# Patient Record
Sex: FEMALE | Race: WHITE | Employment: OTHER | URBAN - METROPOLITAN AREA
[De-identification: names, ages, dates, MRNs, and addresses within clinical notes are randomized per-mention and may not be internally consistent; named-entity substitution may affect disease eponyms.]

---

## 2017-01-29 DIAGNOSIS — E03.9 HYPOTHYROIDISM: ICD-10-CM

## 2017-01-30 RX ORDER — LEVOTHYROXINE SODIUM 0.1 MG/1
TABLET ORAL
Qty: 90 TABLET | Refills: 0 | Status: SHIPPED | OUTPATIENT
Start: 2017-01-30 | End: 2017-04-29 | Stop reason: SDUPTHER

## 2017-02-27 ENCOUNTER — OFFICE VISIT (OUTPATIENT)
Dept: FAMILY MEDICINE CLINIC | Age: 82
End: 2017-02-27

## 2017-02-27 VITALS
BODY MASS INDEX: 32.89 KG/M2 | HEART RATE: 106 BPM | DIASTOLIC BLOOD PRESSURE: 66 MMHG | RESPIRATION RATE: 12 BRPM | OXYGEN SATURATION: 97 % | SYSTOLIC BLOOD PRESSURE: 108 MMHG | WEIGHT: 217 LBS | HEIGHT: 68 IN

## 2017-02-27 DIAGNOSIS — J06.9 PROTRACTED URI: Primary | ICD-10-CM

## 2017-02-27 PROCEDURE — G8484 FLU IMMUNIZE NO ADMIN: HCPCS | Performed by: NURSE PRACTITIONER

## 2017-02-27 PROCEDURE — G8427 DOCREV CUR MEDS BY ELIG CLIN: HCPCS | Performed by: NURSE PRACTITIONER

## 2017-02-27 PROCEDURE — 4040F PNEUMOC VAC/ADMIN/RCVD: CPT | Performed by: NURSE PRACTITIONER

## 2017-02-27 PROCEDURE — G8417 CALC BMI ABV UP PARAM F/U: HCPCS | Performed by: NURSE PRACTITIONER

## 2017-02-27 PROCEDURE — 1090F PRES/ABSN URINE INCON ASSESS: CPT | Performed by: NURSE PRACTITIONER

## 2017-02-27 PROCEDURE — 1123F ACP DISCUSS/DSCN MKR DOCD: CPT | Performed by: NURSE PRACTITIONER

## 2017-02-27 PROCEDURE — 1036F TOBACCO NON-USER: CPT | Performed by: NURSE PRACTITIONER

## 2017-02-27 PROCEDURE — 99213 OFFICE O/P EST LOW 20 MIN: CPT | Performed by: NURSE PRACTITIONER

## 2017-02-27 PROCEDURE — G8400 PT W/DXA NO RESULTS DOC: HCPCS | Performed by: NURSE PRACTITIONER

## 2017-02-27 RX ORDER — AMOXICILLIN 875 MG/1
875 TABLET, COATED ORAL 2 TIMES DAILY
Qty: 14 TABLET | Refills: 0 | Status: SHIPPED | OUTPATIENT
Start: 2017-02-27 | End: 2017-03-06

## 2017-02-27 ASSESSMENT — ENCOUNTER SYMPTOMS
SHORTNESS OF BREATH: 0
COUGH: 1
RHINORRHEA: 1
WHEEZING: 0
SORE THROAT: 0
CHEST TIGHTNESS: 0
SINUS PRESSURE: 1

## 2017-02-27 ASSESSMENT — PATIENT HEALTH QUESTIONNAIRE - PHQ9
2. FEELING DOWN, DEPRESSED OR HOPELESS: 0
1. LITTLE INTEREST OR PLEASURE IN DOING THINGS: 0
SUM OF ALL RESPONSES TO PHQ9 QUESTIONS 1 & 2: 0
SUM OF ALL RESPONSES TO PHQ QUESTIONS 1-9: 0

## 2017-03-15 ENCOUNTER — OFFICE VISIT (OUTPATIENT)
Dept: ENT CLINIC | Age: 82
End: 2017-03-15

## 2017-03-15 VITALS
HEIGHT: 68 IN | BODY MASS INDEX: 33.19 KG/M2 | WEIGHT: 219 LBS | DIASTOLIC BLOOD PRESSURE: 70 MMHG | SYSTOLIC BLOOD PRESSURE: 119 MMHG | HEART RATE: 93 BPM

## 2017-03-15 DIAGNOSIS — H90.0 CONDUCTIVE HEARING LOSS OF BOTH EARS: ICD-10-CM

## 2017-03-15 DIAGNOSIS — H61.23 IMPACTED CERUMEN OF BOTH EARS: Primary | ICD-10-CM

## 2017-03-15 PROCEDURE — 69210 REMOVE IMPACTED EAR WAX UNI: CPT | Performed by: OTOLARYNGOLOGY

## 2017-03-15 PROCEDURE — 1036F TOBACCO NON-USER: CPT | Performed by: OTOLARYNGOLOGY

## 2017-04-29 DIAGNOSIS — E03.9 HYPOTHYROIDISM: ICD-10-CM

## 2017-05-01 RX ORDER — LEVOTHYROXINE SODIUM 0.1 MG/1
TABLET ORAL
Qty: 90 TABLET | Refills: 0 | Status: SHIPPED | OUTPATIENT
Start: 2017-05-01 | End: 2017-07-27 | Stop reason: SDUPTHER

## 2017-07-27 DIAGNOSIS — E03.9 HYPOTHYROIDISM: ICD-10-CM

## 2017-07-27 DIAGNOSIS — E03.9 ACQUIRED HYPOTHYROIDISM: ICD-10-CM

## 2017-07-27 LAB — T4 TOTAL: 8.1 UG/DL (ref 4.5–10.9)

## 2017-07-27 RX ORDER — LEVOTHYROXINE SODIUM 0.1 MG/1
TABLET ORAL
Qty: 90 TABLET | Refills: 0 | Status: SHIPPED | OUTPATIENT
Start: 2017-07-27 | End: 2017-10-30 | Stop reason: SDUPTHER

## 2017-07-28 LAB — TSH SERPL DL<=0.05 MIU/L-ACNC: 3.71 UIU/ML (ref 0.27–4.2)

## 2017-09-13 ENCOUNTER — OFFICE VISIT (OUTPATIENT)
Dept: ENT CLINIC | Age: 82
End: 2017-09-13

## 2017-09-13 VITALS
SYSTOLIC BLOOD PRESSURE: 131 MMHG | DIASTOLIC BLOOD PRESSURE: 76 MMHG | HEART RATE: 92 BPM | BODY MASS INDEX: 33.05 KG/M2 | HEIGHT: 68 IN | WEIGHT: 218.1 LBS

## 2017-09-13 DIAGNOSIS — H61.23 IMPACTED CERUMEN OF BOTH EARS: Primary | ICD-10-CM

## 2017-09-13 DIAGNOSIS — H90.0 CONDUCTIVE HEARING LOSS OF BOTH EARS: ICD-10-CM

## 2017-09-13 PROCEDURE — 1036F TOBACCO NON-USER: CPT | Performed by: OTOLARYNGOLOGY

## 2017-10-30 DIAGNOSIS — E03.9 HYPOTHYROIDISM: ICD-10-CM

## 2017-10-31 RX ORDER — LEVOTHYROXINE SODIUM 0.1 MG/1
TABLET ORAL
Qty: 90 TABLET | Refills: 1 | Status: SHIPPED | OUTPATIENT
Start: 2017-10-31 | End: 2018-04-21 | Stop reason: SDUPTHER

## 2017-11-06 ENCOUNTER — TELEPHONE (OUTPATIENT)
Dept: FAMILY MEDICINE CLINIC | Age: 82
End: 2017-11-06

## 2017-11-06 NOTE — TELEPHONE ENCOUNTER
Per EPIC report patient is due for office visit. Left message for patient to call for appointment    I left message at both home and mobil numbers for pt to call for appt.     Please reach out to patient again later this week if she has not made an appt with either Lisa Bolus or [employer]

## 2017-11-07 NOTE — TELEPHONE ENCOUNTER
Called patient and informed her that she is due for an appt. Patient seemed upset that she had to schedule an appt even after seeing Haylee Justice earlier this year, but scheduled for 12/01/2017. This message has been completed. Please close encounter.

## 2017-12-01 ENCOUNTER — OFFICE VISIT (OUTPATIENT)
Dept: FAMILY MEDICINE CLINIC | Age: 82
End: 2017-12-01

## 2017-12-01 VITALS
TEMPERATURE: 97.7 F | HEART RATE: 93 BPM | WEIGHT: 217 LBS | SYSTOLIC BLOOD PRESSURE: 130 MMHG | DIASTOLIC BLOOD PRESSURE: 70 MMHG | BODY MASS INDEX: 32.89 KG/M2 | HEIGHT: 68 IN | RESPIRATION RATE: 14 BRPM | OXYGEN SATURATION: 98 %

## 2017-12-01 DIAGNOSIS — E03.9 ACQUIRED HYPOTHYROIDISM: ICD-10-CM

## 2017-12-01 DIAGNOSIS — M54.50 CHRONIC LEFT-SIDED LOW BACK PAIN WITHOUT SCIATICA: ICD-10-CM

## 2017-12-01 DIAGNOSIS — N18.4 CHRONIC KIDNEY DISEASE (CKD), STAGE IV (SEVERE) (HCC): ICD-10-CM

## 2017-12-01 DIAGNOSIS — I10 ESSENTIAL HYPERTENSION: ICD-10-CM

## 2017-12-01 DIAGNOSIS — G89.29 CHRONIC LEFT-SIDED LOW BACK PAIN WITHOUT SCIATICA: ICD-10-CM

## 2017-12-01 DIAGNOSIS — J44.9 CHRONIC OBSTRUCTIVE PULMONARY DISEASE, UNSPECIFIED COPD TYPE (HCC): Primary | ICD-10-CM

## 2017-12-01 PROCEDURE — 4040F PNEUMOC VAC/ADMIN/RCVD: CPT | Performed by: FAMILY MEDICINE

## 2017-12-01 PROCEDURE — G8417 CALC BMI ABV UP PARAM F/U: HCPCS | Performed by: FAMILY MEDICINE

## 2017-12-01 PROCEDURE — 1123F ACP DISCUSS/DSCN MKR DOCD: CPT | Performed by: FAMILY MEDICINE

## 2017-12-01 PROCEDURE — 3023F SPIROM DOC REV: CPT | Performed by: FAMILY MEDICINE

## 2017-12-01 PROCEDURE — G8926 SPIRO NO PERF OR DOC: HCPCS | Performed by: FAMILY MEDICINE

## 2017-12-01 PROCEDURE — G8484 FLU IMMUNIZE NO ADMIN: HCPCS | Performed by: FAMILY MEDICINE

## 2017-12-01 PROCEDURE — 1036F TOBACCO NON-USER: CPT | Performed by: FAMILY MEDICINE

## 2017-12-01 PROCEDURE — 99214 OFFICE O/P EST MOD 30 MIN: CPT | Performed by: FAMILY MEDICINE

## 2017-12-01 PROCEDURE — 1090F PRES/ABSN URINE INCON ASSESS: CPT | Performed by: FAMILY MEDICINE

## 2017-12-01 PROCEDURE — G8427 DOCREV CUR MEDS BY ELIG CLIN: HCPCS | Performed by: FAMILY MEDICINE

## 2017-12-01 PROCEDURE — G8400 PT W/DXA NO RESULTS DOC: HCPCS | Performed by: FAMILY MEDICINE

## 2017-12-01 ASSESSMENT — ENCOUNTER SYMPTOMS
COUGH: 0
SINUS PRESSURE: 0
BACK PAIN: 1
SHORTNESS OF BREATH: 0
WHEEZING: 0
GASTROINTESTINAL NEGATIVE: 1

## 2017-12-01 NOTE — PROGRESS NOTES
normal.       Assessment/Plan:    Ximena Carlton was seen today for other. Diagnoses and all orders for this visit:    Chronic obstructive pulmonary disease, unspecified COPD type ([facility] [address]  Visit Dx:  Chronic obstructive pulmonary disease, unspecified COPD type ([address]  Stable based upon symptoms and exam. Continue current treatment plan and follow up at least yearly. Last edited 12/01/17 11:02 EST by Delaney Beverly MD     Chronic kidney disease (CKD), stage IV (severe) ([address]  Quality & Risk Score Accuracy - MEDICARE ADVANTAGE    Visit Dx:  Chronic kidney disease (CKD), stage IV (severe) ([address]  Worsening based upon last GFR. Appropriate treatment plan in place, including avoidance of nephrotoxins- continue. Monitoring per progress note/disposition.   Additional documentation:  see HPI regarding dialysis            Essential hypertension  Reasonably well controlled  Continue current treatment   Acquired hypothyroidism  Stable/Controlled  Continue current treatment   Chronic left-sided low back pain without sciatica  Symptomatic treatment   Return if not better

## 2017-12-01 NOTE — PATIENT INSTRUCTIONS
Patient Education        Back Pain: Care Instructions  Your Care Instructions    Back pain has many possible causes. It is often related to problems with muscles and ligaments of the back. It may also be related to problems with the nerves, discs, or bones of the back. Moving, lifting, standing, sitting, or sleeping in an awkward way can strain the back. Sometimes you don't notice the injury until later. Arthritis is another common cause of back pain. Although it may hurt a lot, back pain usually improves on its own within several weeks. Most people recover in 12 weeks or less. Using good home treatment and being careful not to stress your back can help you feel better sooner. Follow-up care is a key part of your treatment and safety. Be sure to make and go to all appointments, and call your doctor if you are having problems. Its also a good idea to know your test results and keep a list of the medicines you take. How can you care for yourself at home? · Sit or lie in positions that are most comfortable and reduce your pain. Try one of these positions when you lie down:  ¨ Lie on your back with your knees bent and supported by large pillows. ¨ Lie on the floor with your legs on the seat of a sofa or chair. Sherian Seals on your side with your knees and hips bent and a pillow between your legs. ¨ Lie on your stomach if it does not make pain worse. · Do not sit up in bed, and avoid soft couches and twisted positions. Bed rest can help relieve pain at first, but it delays healing. Avoid bed rest after the first day of back pain. · Change positions every 30 minutes. If you must sit for long periods of time, take breaks from sitting. Get up and walk around, or lie in a comfortable position. · Try using a heating pad on a low or medium setting for 15 to 20 minutes every 2 or 3 hours. Try a warm shower in place of one session with the heating pad.   · You can also try an ice pack for 10 to 15 minutes every 2 to 3

## 2018-03-13 ENCOUNTER — OFFICE VISIT (OUTPATIENT)
Dept: ENT CLINIC | Age: 83
End: 2018-03-13

## 2018-03-13 VITALS — HEART RATE: 85 BPM | SYSTOLIC BLOOD PRESSURE: 118 MMHG | DIASTOLIC BLOOD PRESSURE: 84 MMHG

## 2018-03-13 DIAGNOSIS — H90.0 CONDUCTIVE HEARING LOSS OF BOTH EARS: ICD-10-CM

## 2018-03-13 DIAGNOSIS — H61.23 IMPACTED CERUMEN OF BOTH EARS: ICD-10-CM

## 2018-03-13 PROCEDURE — 69210 REMOVE IMPACTED EAR WAX UNI: CPT | Performed by: OTOLARYNGOLOGY

## 2018-03-13 RX ORDER — HYDROCHLOROTHIAZIDE 25 MG/1
25 TABLET ORAL DAILY
COMMUNITY
End: 2018-10-05

## 2018-03-13 NOTE — PATIENT INSTRUCTIONS
1. You may use an over the counter ear wax removal kit (such as Murine, Bausch and Lomb, NeilMed, or Debrox wax removal system) for ear wax removal, as needed. 2. It may help to use Debrox (OTC) for 4 days prior to future visits for ear cleaning. This may soften your ear wax and facilitate removal of the wax. NO Q-TIPS IN THE EARS Do not clean ears with Q-tips or any other instrument. I emphasized the danger of this practice, especially rupture of ear drum, dislocation or other damage to ossicle, and permanent, irreversible, and irreparable hearing loss.

## 2018-04-21 DIAGNOSIS — E03.9 HYPOTHYROIDISM: ICD-10-CM

## 2018-04-23 RX ORDER — LEVOTHYROXINE SODIUM 0.1 MG/1
TABLET ORAL
Qty: 90 TABLET | Refills: 1 | Status: SHIPPED | OUTPATIENT
Start: 2018-04-23 | End: 2018-10-31 | Stop reason: SDUPTHER

## 2018-07-12 ENCOUNTER — OFFICE VISIT (OUTPATIENT)
Dept: FAMILY MEDICINE CLINIC | Age: 83
End: 2018-07-12

## 2018-07-12 VITALS
TEMPERATURE: 98.7 F | HEART RATE: 93 BPM | DIASTOLIC BLOOD PRESSURE: 84 MMHG | SYSTOLIC BLOOD PRESSURE: 140 MMHG | OXYGEN SATURATION: 98 % | WEIGHT: 212 LBS | BODY MASS INDEX: 32.24 KG/M2

## 2018-07-12 DIAGNOSIS — J44.9 CHRONIC OBSTRUCTIVE PULMONARY DISEASE, UNSPECIFIED COPD TYPE (HCC): ICD-10-CM

## 2018-07-12 DIAGNOSIS — H10.33 ACUTE CONJUNCTIVITIS OF BOTH EYES, UNSPECIFIED ACUTE CONJUNCTIVITIS TYPE: ICD-10-CM

## 2018-07-12 DIAGNOSIS — R05.9 COUGH: Primary | ICD-10-CM

## 2018-07-12 PROCEDURE — G8400 PT W/DXA NO RESULTS DOC: HCPCS | Performed by: FAMILY MEDICINE

## 2018-07-12 PROCEDURE — 1036F TOBACCO NON-USER: CPT | Performed by: FAMILY MEDICINE

## 2018-07-12 PROCEDURE — 1123F ACP DISCUSS/DSCN MKR DOCD: CPT | Performed by: FAMILY MEDICINE

## 2018-07-12 PROCEDURE — 3023F SPIROM DOC REV: CPT | Performed by: FAMILY MEDICINE

## 2018-07-12 PROCEDURE — 99213 OFFICE O/P EST LOW 20 MIN: CPT | Performed by: FAMILY MEDICINE

## 2018-07-12 PROCEDURE — G8427 DOCREV CUR MEDS BY ELIG CLIN: HCPCS | Performed by: FAMILY MEDICINE

## 2018-07-12 PROCEDURE — 4040F PNEUMOC VAC/ADMIN/RCVD: CPT | Performed by: FAMILY MEDICINE

## 2018-07-12 PROCEDURE — 1101F PT FALLS ASSESS-DOCD LE1/YR: CPT | Performed by: FAMILY MEDICINE

## 2018-07-12 PROCEDURE — 1090F PRES/ABSN URINE INCON ASSESS: CPT | Performed by: FAMILY MEDICINE

## 2018-07-12 PROCEDURE — G8417 CALC BMI ABV UP PARAM F/U: HCPCS | Performed by: FAMILY MEDICINE

## 2018-07-12 PROCEDURE — G8926 SPIRO NO PERF OR DOC: HCPCS | Performed by: FAMILY MEDICINE

## 2018-07-12 RX ORDER — AZITHROMYCIN 250 MG/1
TABLET, FILM COATED ORAL
Qty: 6 TABLET | Refills: 0 | Status: SHIPPED | OUTPATIENT
Start: 2018-07-12 | End: 2018-07-22

## 2018-07-12 RX ORDER — SULFACETAMIDE SODIUM 100 MG/ML
1 SOLUTION/ DROPS OPHTHALMIC 4 TIMES DAILY
Qty: 1 BOTTLE | Refills: 0 | Status: SHIPPED | OUTPATIENT
Start: 2018-07-12 | End: 2018-07-22

## 2018-07-12 ASSESSMENT — PATIENT HEALTH QUESTIONNAIRE - PHQ9
1. LITTLE INTEREST OR PLEASURE IN DOING THINGS: 0
SUM OF ALL RESPONSES TO PHQ QUESTIONS 1-9: 0
SUM OF ALL RESPONSES TO PHQ9 QUESTIONS 1 & 2: 0
2. FEELING DOWN, DEPRESSED OR HOPELESS: 0

## 2018-07-12 ASSESSMENT — ENCOUNTER SYMPTOMS
COUGH: 1
GASTROINTESTINAL NEGATIVE: 1
EYE REDNESS: 1
SHORTNESS OF BREATH: 0
SORE THROAT: 1
WHEEZING: 0

## 2018-07-12 NOTE — PATIENT INSTRUCTIONS
Patient Education        Chronic Obstructive Pulmonary Disease (COPD): Care Instructions  Your Care Instructions    Chronic obstructive pulmonary disease (COPD) is a general term for a group of lung diseases, including emphysema and chronic bronchitis. People with COPD have decreased airflow in and out of the lungs, which makes it hard to breathe. The airways also can get clogged with thick mucus. Cigarette smoking is a major cause of COPD. Although there is no cure for COPD, you can slow its progress. Following your treatment plan and taking care of yourself can help you feel better and live longer. Follow-up care is a key part of your treatment and safety. Be sure to make and go to all appointments, and call your doctor if you are having problems. It's also a good idea to know your test results and keep a list of the medicines you take. How can you care for yourself at home?   Staying healthy    · Do not smoke. This is the most important step you can take to prevent more damage to your lungs. If you need help quitting, talk to your doctor about stop-smoking programs and medicines. These can increase your chances of quitting for good.     · Avoid colds and flu. Get a pneumococcal vaccine shot. If you have had one before, ask your doctor whether you need a second dose. Get the flu vaccine every fall. If you must be around people with colds or the flu, wash your hands often.     · Avoid secondhand smoke, air pollution, and high altitudes. Also avoid cold, dry air and hot, humid air. Stay at home with your windows closed when air pollution is bad.    Medicines and oxygen therapy    · Take your medicines exactly as prescribed. Call your doctor if you think you are having a problem with your medicine.     · You may be taking medicines such as:  ¨ Bronchodilators. These help open your airways and make breathing easier.  Bronchodilators are either short-acting (work for 6 to 9 hours) or long-acting (work for 25 hours). You inhale most bronchodilators, so they start to act quickly. Always carry your quick-relief inhaler with you in case you need it while you are away from home. ¨ Corticosteroids (prednisone, budesonide). These reduce airway inflammation. They come in pill or inhaled form. You must take these medicines every day for them to work well.     · A spacer may help you get more inhaled medicine to your lungs. Ask your doctor or pharmacist if a spacer is right for you. If it is, ask how to use it properly.     · Do not take any vitamins, over-the-counter medicine, or herbal products without talking to your doctor first.     · If your doctor prescribed antibiotics, take them as directed. Do not stop taking them just because you feel better. You need to take the full course of antibiotics.     · Oxygen therapy boosts the amount of oxygen in your blood and helps you breathe easier. Use the flow rate your doctor has recommended, and do not change it without talking to your doctor first.   Activity    · Get regular exercise. Walking is an easy way to get exercise. Start out slowly, and walk a little more each day.     · Pay attention to your breathing. You are exercising too hard if you cannot talk while you are exercising.     · Take short rest breaks when doing household chores and other activities.     · Learn breathing methods-such as breathing through pursed lips-to help you become less short of breath.     · If your doctor has not set you up with a pulmonary rehabilitation program, talk to him or her about whether rehab is right for you. Rehab includes exercise programs, education about your disease and how to manage it, help with diet and other changes, and emotional support. Diet    · Eat regular, healthy meals. Use bronchodilators about 1 hour before you eat to make it easier to eat. Eat several small meals instead of three large ones. Drink beverages at the end of the meal. Avoid foods that are hard to chew.

## 2018-07-12 NOTE — PROGRESS NOTES
unspecified COPD type ([facility]  Assessment and plan:  Stable based upon symptoms and exam. Continue current treatment plan and follow up at least yearly.   Last edited 07/12/18 18:00 EDT by Chidi Schmidt MD

## 2018-07-13 ENCOUNTER — TELEPHONE (OUTPATIENT)
Dept: FAMILY MEDICINE CLINIC | Age: 83
End: 2018-07-13

## 2018-07-13 NOTE — TELEPHONE ENCOUNTER
Pt's daughter calling and states that her eyes were more itchy and she feels they are worse than when Dr. Malena Bella saw her yesterday. Daughter wants to know what she can do.  Please advise

## 2018-07-13 NOTE — TELEPHONE ENCOUNTER
I suspect this is probably due to an allergy. There are 2 things we can try: If the itching is the redness around the eyes we could use a cortisone cream for her to apply to the skin but this would not go into the eyes nor should it get into the eyes. If the itching is more the eyeball itself we could try an allergy-type drop like we discussed last night. We could also do both if the itching is both the skin and the eyeball. Let me know what they want to do.

## 2018-09-14 ENCOUNTER — OFFICE VISIT (OUTPATIENT)
Dept: FAMILY MEDICINE CLINIC | Age: 83
End: 2018-09-14

## 2018-09-14 VITALS
BODY MASS INDEX: 32.21 KG/M2 | WEIGHT: 211.8 LBS | HEART RATE: 85 BPM | OXYGEN SATURATION: 96 % | DIASTOLIC BLOOD PRESSURE: 82 MMHG | SYSTOLIC BLOOD PRESSURE: 122 MMHG

## 2018-09-14 DIAGNOSIS — M25.512 ACUTE PAIN OF LEFT SHOULDER: Primary | ICD-10-CM

## 2018-09-14 PROCEDURE — 4040F PNEUMOC VAC/ADMIN/RCVD: CPT | Performed by: FAMILY MEDICINE

## 2018-09-14 PROCEDURE — 1036F TOBACCO NON-USER: CPT | Performed by: FAMILY MEDICINE

## 2018-09-14 PROCEDURE — G8427 DOCREV CUR MEDS BY ELIG CLIN: HCPCS | Performed by: FAMILY MEDICINE

## 2018-09-14 PROCEDURE — 1090F PRES/ABSN URINE INCON ASSESS: CPT | Performed by: FAMILY MEDICINE

## 2018-09-14 PROCEDURE — G8417 CALC BMI ABV UP PARAM F/U: HCPCS | Performed by: FAMILY MEDICINE

## 2018-09-14 PROCEDURE — 99213 OFFICE O/P EST LOW 20 MIN: CPT | Performed by: FAMILY MEDICINE

## 2018-09-14 PROCEDURE — 1101F PT FALLS ASSESS-DOCD LE1/YR: CPT | Performed by: FAMILY MEDICINE

## 2018-09-14 PROCEDURE — G8400 PT W/DXA NO RESULTS DOC: HCPCS | Performed by: FAMILY MEDICINE

## 2018-09-14 PROCEDURE — 1123F ACP DISCUSS/DSCN MKR DOCD: CPT | Performed by: FAMILY MEDICINE

## 2018-09-14 NOTE — PATIENT INSTRUCTIONS
Patient Education   Patient Education        Shoulder Pain: Care Instructions  Your Care Instructions    You can hurt your shoulder by using it too much during an activity, such as fishing or baseball. It can also happen as part of the everyday wear and tear of getting older. Shoulder injuries can be slow to heal, but your shoulder should get better with time. Your doctor may recommend a sling to rest your shoulder. If you have injured your shoulder, you may need testing and treatment. Follow-up care is a key part of your treatment and safety. Be sure to make and go to all appointments, and call your doctor if you are having problems. It's also a good idea to know your test results and keep a list of the medicines you take. How can you care for yourself at home? · Take pain medicines exactly as directed. ¨ If the doctor gave you a prescription medicine for pain, take it as prescribed. ¨ If you are not taking a prescription pain medicine, ask your doctor if you can take an over-the-counter medicine. ¨ Do not take two or more pain medicines at the same time unless the doctor told you to. Many pain medicines contain acetaminophen, which is Tylenol. Too much acetaminophen (Tylenol) can be harmful. · If your doctor recommends that you wear a sling, use it as directed. Do not take it off before your doctor tells you to. · Put ice or a cold pack on the sore area for 10 to 20 minutes at a time. Put a thin cloth between the ice and your skin. · If there is no swelling, you can put moist heat, a heating pad, or a warm cloth on your shoulder. Some doctors suggest alternating between hot and cold. · Rest your shoulder for a few days. If your doctor recommends it, you can then begin gentle exercise of the shoulder, but do not lift anything heavy. When should you call for help? Call 911 anytime you think you may need emergency care. For example, call if:    · You have chest pain or pressure.  This may occur

## 2018-09-14 NOTE — PROGRESS NOTES
patient's condition  Advise to return here if worse or go to nearest ER  Encourage fluids  Pt discharged in stable condition at 11:14      1. Acute pain of left shoulder        No orders of the defined types were placed in this encounter. Return if symptoms worsen or fail to improve.     Deanna Claudio MD    9/14/2018  11:14 AM

## 2018-10-05 ENCOUNTER — OFFICE VISIT (OUTPATIENT)
Dept: ENT CLINIC | Age: 83
End: 2018-10-05
Payer: MEDICARE

## 2018-10-05 VITALS
HEIGHT: 68 IN | SYSTOLIC BLOOD PRESSURE: 136 MMHG | HEART RATE: 106 BPM | DIASTOLIC BLOOD PRESSURE: 92 MMHG | BODY MASS INDEX: 32.13 KG/M2 | WEIGHT: 212 LBS

## 2018-10-05 DIAGNOSIS — H90.0 CONDUCTIVE HEARING LOSS OF BOTH EARS: ICD-10-CM

## 2018-10-05 DIAGNOSIS — H61.23 IMPACTED CERUMEN OF BOTH EARS: Primary | ICD-10-CM

## 2018-10-05 PROCEDURE — 69210 REMOVE IMPACTED EAR WAX UNI: CPT | Performed by: OTOLARYNGOLOGY

## 2019-02-13 DIAGNOSIS — E03.9 HYPOTHYROIDISM: ICD-10-CM

## 2019-02-13 LAB
ALBUMIN SERPL-MCNC: 4.2 G/DL (ref 3.4–5)
ANION GAP SERPL CALCULATED.3IONS-SCNC: 13 MMOL/L (ref 3–16)
BUN BLDV-MCNC: 46 MG/DL (ref 7–20)
CALCIUM SERPL-MCNC: 10.1 MG/DL (ref 8.3–10.6)
CHLORIDE BLD-SCNC: 103 MMOL/L (ref 99–110)
CO2: 24 MMOL/L (ref 21–32)
CREAT SERPL-MCNC: 2.1 MG/DL (ref 0.6–1.2)
CREATININE URINE: 158.7 MG/DL (ref 28–259)
GFR AFRICAN AMERICAN: 27
GFR NON-AFRICAN AMERICAN: 23
GLUCOSE BLD-MCNC: 86 MG/DL (ref 70–99)
HCT VFR BLD CALC: 43.9 % (ref 36–48)
HEMOGLOBIN: 13.9 G/DL (ref 12–16)
MCH RBC QN AUTO: 29.6 PG (ref 26–34)
MCHC RBC AUTO-ENTMCNC: 31.6 G/DL (ref 31–36)
MCV RBC AUTO: 93.8 FL (ref 80–100)
PDW BLD-RTO: 15.3 % (ref 12.4–15.4)
PHOSPHORUS: 3.8 MG/DL (ref 2.5–4.9)
PLATELET # BLD: 194 K/UL (ref 135–450)
PMV BLD AUTO: 10.6 FL (ref 5–10.5)
POTASSIUM SERPL-SCNC: 4.7 MMOL/L (ref 3.5–5.1)
RBC # BLD: 4.68 M/UL (ref 4–5.2)
SODIUM BLD-SCNC: 140 MMOL/L (ref 136–145)
T4 TOTAL: 8.1 UG/DL (ref 4.5–10.9)
TSH SERPL DL<=0.05 MIU/L-ACNC: 2.45 UIU/ML (ref 0.27–4.2)
WBC # BLD: 6.8 K/UL (ref 4–11)

## 2019-02-19 PROBLEM — R80.0 ISOLATED PROTEINURIA: Status: ACTIVE | Noted: 2019-02-19

## 2019-04-05 ENCOUNTER — OFFICE VISIT (OUTPATIENT)
Dept: ENT CLINIC | Age: 84
End: 2019-04-05
Payer: MEDICARE

## 2019-04-05 VITALS
DIASTOLIC BLOOD PRESSURE: 73 MMHG | SYSTOLIC BLOOD PRESSURE: 130 MMHG | HEIGHT: 68 IN | HEART RATE: 103 BPM | WEIGHT: 204 LBS | BODY MASS INDEX: 30.92 KG/M2

## 2019-04-05 DIAGNOSIS — H61.23 IMPACTED CERUMEN OF BOTH EARS: Primary | ICD-10-CM

## 2019-04-05 DIAGNOSIS — H90.0 CONDUCTIVE HEARING LOSS OF BOTH EARS: ICD-10-CM

## 2019-04-05 PROCEDURE — 69210 REMOVE IMPACTED EAR WAX UNI: CPT | Performed by: OTOLARYNGOLOGY

## 2019-04-05 NOTE — PROGRESS NOTES
Chief Complaint   Patient presents with    Cerumen Impaction    Hearing Loss       HISTORY:    Price Sexton stated that the hearing is decreased in both ears, which are plugged up with ear wax. Right ear, 3 weeks ago, stopped wearing my ear rings and hearing aid because ear lobe was hurting, not swollen or red, so just operated with the my left ehearing aid and I did well with it and then it just disappeared. It is gone now. She used Debrox on the right ear this morning. Wears hearing aids in both ears. She stopped wearing the right HA when she had the discomfort and seemed to hear just as well with only the left aid in place. EXAMINATION:    Both external ear canals were occluded, right 100% and left 80%, by cerumen impaction, obscuring visualization of the TMs. PROCEDURE - REMOVAL OF BILATERAL CERUMEN IMPACTION:   The cerumen impaction was removed from both of the EACs, under otomicroscopy visualization, with instrumentation, using a suction on the right ear and a wire loop on the left ear. After successful cerumen removal, the EACs appeared to be normal and clear bilaterally without mass, exudate, or edema. The tympanic membranes were mildly dull and thickened, non-erythematous, with no perforations or middle ear effusion. There was no evidence of acute disease. IMPRESSION / [place] / Talon Meigs / PROCEDURES:       Price Sexton was seen today for cerumen impaction and hearing loss. Diagnoses and all orders for this visit:    Impacted cerumen of both ears    Conductive hearing loss of both ears         RECOMMENDATIONS / PLAN:   1. See Patient Instructions. 2. Return in about 6 months (around 10/5/2019) for recheck/follow-up, possible ear cleaning, and sooner if condition worsens.

## 2019-04-23 ENCOUNTER — TELEPHONE (OUTPATIENT)
Dept: FAMILY MEDICINE CLINIC | Age: 84
End: 2019-04-23

## 2019-05-30 ENCOUNTER — OFFICE VISIT (OUTPATIENT)
Dept: FAMILY MEDICINE CLINIC | Age: 84
End: 2019-05-30
Payer: MEDICARE

## 2019-05-30 VITALS
OXYGEN SATURATION: 97 % | BODY MASS INDEX: 30.87 KG/M2 | HEART RATE: 96 BPM | WEIGHT: 203 LBS | SYSTOLIC BLOOD PRESSURE: 114 MMHG | DIASTOLIC BLOOD PRESSURE: 82 MMHG

## 2019-05-30 DIAGNOSIS — E03.9 ACQUIRED HYPOTHYROIDISM: ICD-10-CM

## 2019-05-30 DIAGNOSIS — Z00.00 ROUTINE GENERAL MEDICAL EXAMINATION AT A HEALTH CARE FACILITY: ICD-10-CM

## 2019-05-30 DIAGNOSIS — Z00.00 MEDICARE ANNUAL WELLNESS VISIT, INITIAL: Primary | ICD-10-CM

## 2019-05-30 DIAGNOSIS — I10 ESSENTIAL HYPERTENSION: ICD-10-CM

## 2019-05-30 DIAGNOSIS — J44.9 CHRONIC OBSTRUCTIVE PULMONARY DISEASE, UNSPECIFIED COPD TYPE (HCC): ICD-10-CM

## 2019-05-30 DIAGNOSIS — N18.4 STAGE 4 CHRONIC KIDNEY DISEASE (HCC): ICD-10-CM

## 2019-05-30 PROCEDURE — 1123F ACP DISCUSS/DSCN MKR DOCD: CPT | Performed by: FAMILY MEDICINE

## 2019-05-30 PROCEDURE — G0438 PPPS, INITIAL VISIT: HCPCS | Performed by: FAMILY MEDICINE

## 2019-05-30 PROCEDURE — 4040F PNEUMOC VAC/ADMIN/RCVD: CPT | Performed by: FAMILY MEDICINE

## 2019-05-30 ASSESSMENT — LIFESTYLE VARIABLES
AUDIT TOTAL SCORE: 1
HOW OFTEN DO YOU HAVE SIX OR MORE DRINKS ON ONE OCCASION: 0
HOW OFTEN DURING THE LAST YEAR HAVE YOU FAILED TO DO WHAT WAS NORMALLY EXPECTED FROM YOU BECAUSE OF DRINKING: 0
HOW OFTEN DURING THE LAST YEAR HAVE YOU FOUND THAT YOU WERE NOT ABLE TO STOP DRINKING ONCE YOU HAD STARTED: 0
HOW OFTEN DURING THE LAST YEAR HAVE YOU NEEDED AN ALCOHOLIC DRINK FIRST THING IN THE MORNING TO GET YOURSELF GOING AFTER A NIGHT OF HEAVY DRINKING: 0
AUDIT-C TOTAL SCORE: 1
HOW OFTEN DURING THE LAST YEAR HAVE YOU BEEN UNABLE TO REMEMBER WHAT HAPPENED THE NIGHT BEFORE BECAUSE YOU HAD BEEN DRINKING: 0
HOW MANY STANDARD DRINKS CONTAINING ALCOHOL DO YOU HAVE ON A TYPICAL DAY: 0
HOW OFTEN DURING THE LAST YEAR HAVE YOU HAD A FEELING OF GUILT OR REMORSE AFTER DRINKING: 0
HAS A RELATIVE, FRIEND, DOCTOR, OR ANOTHER HEALTH PROFESSIONAL EXPRESSED CONCERN ABOUT YOUR DRINKING OR SUGGESTED YOU CUT DOWN: 0
HOW OFTEN DO YOU HAVE A DRINK CONTAINING ALCOHOL: 1
HAVE YOU OR SOMEONE ELSE BEEN INJURED AS A RESULT OF YOUR DRINKING: 0

## 2019-05-30 ASSESSMENT — ANXIETY QUESTIONNAIRES: GAD7 TOTAL SCORE: 1

## 2019-05-30 ASSESSMENT — PATIENT HEALTH QUESTIONNAIRE - PHQ9
SUM OF ALL RESPONSES TO PHQ QUESTIONS 1-9: 1
SUM OF ALL RESPONSES TO PHQ QUESTIONS 1-9: 1

## 2019-05-30 NOTE — PROGRESS NOTES
Medicare Annual Wellness Visit  Name: Jaylen Nina Date: 2019   MRN: [id number] Sex: Female   Age: 82 y.o. Ethnicity: Non-/Non    : 1935 Race: Alexia Ambriz is here for Annual Exam (AWV)    Screenings for behavioral, psychosocial and functional/safety risks, and cognitive dysfunction are all negative except as indicated below. These results, as well as other patient data from the [address] form, are documented in Flowsheets linked to this Encounter. Allergies   Allergen Reactions    Cephalexin Other (See Comments)     headaches    Cephalexin     Reclast [Zoledronic Acid]     Sulfa Antibiotics      Prior to Visit Medications    Medication Sig Taking? Authorizing Provider   Solifenacin Succinate (VESICARE PO) Take 1 tablet by mouth every morning Yes Historical Provider, MD   levothyroxine (SYNTHROID) 100 MCG tablet TAKE 1 TABLET BY MOUTH EVERY DAY Yes Oscar Mendez MD   spironolactone (ALDACTONE) 25 MG tablet TAKE 1 TABLET BY MOUTH EVERY DAY Yes Carmen Lugo MD   oxybutynin (DITROPAN) 5 MG tablet TAKE 1 TABLET BY MOUTH EVERY DAY Yes Historical Provider, MD   hydrochlorothiazide (HYDRODIURIL) 25 MG tablet Take 25 mg by mouth daily Yes Historical Provider, MD   Calcium Carbonate (CALCIUM 600 PO) Take by mouth Yes Historical Provider, MD   Mirabegron ER (MYRBETRIQ) 25 MG TB24 Take 1 tablet by mouth daily Yes Historical Provider, MD   allopurinol (ZYLOPRIM) 100 MG tablet Take 100 mg by mouth daily Yes Historical Provider, MD   acetaminophen (TYLENOL) 325 MG tablet Take 650 mg by mouth every 6 hours as needed. Yes Historical Provider, MD   esomeprazole (NEXIUM) 40 MG capsule Take 40 mg by mouth every morning (before breakfast). Yes Historical Provider, MD   Multiple Vitamins-Minerals (MULTIVITAMIN,TX-MINERALS) tablet Take 1 tablet by mouth daily.    Yes Historical Provider, MD     Past Medical History:   Diagnosis Date    Nieves's esophagus     Cholelithiasis 10/2013    CT scan    CKD (chronic kidney disease) stage 4, GFR 15-29 ml/min ([facility])     Dr. Miriam Evangelista COPD (chronic obstructive pulmonary disease) ([facility] [address]     Endometriosis     Hypertension     Hypothyroidism 10/27/2015    Osteoporosis     Overactive bladder     Dr. Khris Poon Right bundle branch block     Thyroid disease     Urethral stricture      Past Surgical History:   Procedure Laterality Date    APPENDECTOMY      BREAST SURGERY  10/2002    breast lesion    CARPAL TUNNEL RELEASE  prior to 2000    left hand    CATARACT REMOVAL  03, 04/2004    both eyes    COLONOSCOPY  2011    benign polyp    CYSTOSCOPY  2011    ENDOMETRIAL ABLATION  07/1960    ESOPHAGEAL DILATATION  2012    EYE SURGERY      HERNIA REPAIR  11/2003    papaesophageal    HYSTERECTOMY      JOINT REPLACEMENT      KNEE ARTHROSCOPY  prior to 2000    both knees    LAMINECTOMY  10/2/13    PLACEMENT OF LEAD AND SPINAL CORD STIMULATOR    SHOULDER SURGERY  06/2003    rotator cuff repair, partial removal collar bone and shoulder bone    LAZARA AND BSO  11/72    TOTAL KNEE ARTHROPLASTY  05/2006    left    TOTAL KNEE ARTHROPLASTY  03/2007    right knee    UPPER GASTROINTESTINAL ENDOSCOPY  2011    Nieves's esophagus    URETHRAL STRICTURE DILATATION  2009     History reviewed. No pertinent family history. CareTeam (Including outside providers/suppliers regularly involved in providing care):   Patient Care Team:  Jennifer Barbosa MD as PCP - General  Freya Flores MD as PCP - [facility] Attributed Provider  Christopher Chicas MD as Consulting Physician (Otolaryngology)    Wt Readings from Last 3 Encounters:   05/30/19 203 lb (92.1 kg)   04/05/19 204 lb (92.5 kg)   02/19/19 205 lb (93 kg)     Vitals:    05/30/19 1553   BP: 114/82   Site: Left Upper Arm   Position: Sitting   Cuff Size: Small Adult   Pulse: 96   SpO2: 97%   Weight: 203 lb (92.1 kg)     Body mass index is 30.87 kg/m².     Based upon direct observation of the patient, evaluation of cognition reveals recent and remote memory intact. Vitals:    05/30/19 1553   BP: 114/82   Site: Left Upper Arm   Position: Sitting   Cuff Size: Small Adult   Pulse: 96   SpO2: 97%   Weight: 203 lb (92.1 kg)     Body mass index is 30.87 kg/m². General Appearance: alert and oriented, in no acute distress  Skin: warm and dry, no rash or erythema  Head: normocephalic and atraumatic  Eyes: pupils equal, round, and reactive to light, extraocular eye movements intact, conjunctivae normal  ENT: tympanic membrane, external ear and ear canal normal bilaterally, nose without deformity,   Neck: supple and non-tender without mass, no thyromegaly or thyroid nodules, no cervical lymphadenopathy  Pulmonary/Chest: clear to auscultation bilaterally- no wheezes, rales or rhonchi, normal air movement, no respiratory distress  Cardiovascular: normal rate, regular rhythm, normal S1 and S2, no murmurs, rubs, clicks, or gallops, no carotid bruits  Abdomen: soft, non-tender, non-distended, normal bowel sounds, no masses or organomegaly  Extremities: no cyanosis, clubbing or edema  Neurologic: reflexes normal and symmetric, no cranial nerve deficit, speech normal  GYN:Rectal: deferred to Gynecologist  Breast: deferred to Gynecologist     Patient's complete Health Risk Assessment and screening values have been reviewed and are found in Flowsheets. The following problems were reviewed today and where indicated follow up appointments were made and/or referrals ordered. Positive Risk Factor Screenings with Interventions:     Health Habits/Nutrition:  Health Habits/Nutrition  Do you exercise for at least 20 minutes 2-3 times per week?: Yes  Have you lost any weight without trying in the past 3 months?: (!) Yes  Do you eat fewer than 2 meals per day?: No  Have you seen a dentist within the past year?: Yes  Body mass index is 30.87 kg/m².   Health Habits/Nutrition Interventions:  · no new issues    Hearing/Vision:  Hearing/Vision  Do you or your family notice any trouble with your hearing?: (!) Yes  Do you have difficulty driving, watching TV, or doing any of your daily activities because of your eyesight?: No  Have you had an eye exam within the past year?: Yes  Hearing/Vision Interventions:  · no new issues    Safety:  Safety  Do you have working smoke detectors?: Yes  Have all throw rugs been removed or fastened?: (!) No  Do you have non-slip mats in all bathtubs?: (!) No(No bathtubs)  Do all of your stairways have a railing or banister?: (!) No(no stairways)  Are your doorways, halls and stairs free of clutter?: Yes  Do you always fasten your seatbelt when you are in a car?: Yes  Safety Interventions:  · no new issues    ADL:  ADLs  In the past 7 days, did you need help from others to perform any of the following everyday activities? Eating, dressing, grooming, bathing, toileting, or walking/balance?: (!) Walking/Balance  In the past 7 days, did you need help from others to take care of any of the following?  Laundry, housekeeping, banking/finances, shopping, telephone use, food preparation, transportation, or taking medications?: (!) Transportation  ADL Interventions:  · no new issues    Personalized Preventive Plan   Current Health Maintenance Status  Immunization History   Administered Date(s) Administered    Influenza Virus Vaccine 09/25/2012    Influenza, High Dose (Fluzone 65 yrs and older) 11/03/2015, 10/30/2017    Pneumococcal 13-valent Conjugate ([id number]) 11/03/2015    Pneumococcal Polysaccharide ([id number]) 11/11/2004, 10/30/2017    Tdap (Boostrix, Adacel) 05/01/2001    Zoster Live (Zostavax) 11/11/2008        Health Maintenance   Topic Date Due    Hepatitis B Vaccine (1 of 3 - Risk 3-dose series) 08/10/1954    Shingles Vaccine (2 of 3) 01/06/2009    DTaP/Tdap/Td vaccine (2 - Td) 05/01/2011    Flu vaccine (Season Ended) 09/01/2019    TSH testing  02/13/2020    Potassium monitoring  02/13/2020    Creatinine monitoring  02/13/2020    DEXA (modify frequency per FRAX score)  Completed    Pneumococcal 65+ years Vaccine  Completed    HPV vaccine  Aged Out     Recommendations for Preventive Services Due: see orders and patient instructions/AVS.  Assessment/Plan:    Wes Hawthorne was seen today for annual exam.    Diagnoses and all orders for this visit:    Routine general medical examination at a health care facility/Medicare annual wellness visit, initial  Return one year  Essential hypertension  Reasonably well controlled  Continue current treatment  Home BP checks  Return 3 months     Acquired hypothyroidism  Asymptomatic/ euthyroid  Continue current treatment. Stage 4 chronic kidney disease ([facility] [address]  Patient is followed by Dr. Jona Lundy  Chronic obstructive pulmonary disease, unspecified COPD type ([facility] [address]  Patient quit smoking cigarettes greater than 10 years ago after at least a 50-pack-year history. She states she is asymptomatic. Quality & Risk Score Accuracy    Visit Dx:  J44.9 - Chronic obstructive pulmonary disease, unspecified COPD type ([facility] [address]  Assessment and plan:  Stable based upon symptoms and exam. Continue current treatment plan and follow up at least yearly. Last edited 05/30/19 19:47 EDT by Vonda Kidd MD       Health Maintenance reviewed with the patient: Shingrix was discussed and recommended.       Recommended screening schedule for the next 5-10 years is provided to the patient in written form: see Patient Instructions/AVS.

## 2019-05-30 NOTE — PATIENT INSTRUCTIONS
Personalized Preventive Plan for Rose Huertas - 5/30/2019  Medicare offers a range of preventive health benefits. Some of the tests and screenings are paid in full while other may be subject to a deductible, co-insurance, and/or copay. Some of these benefits include a comprehensive review of your medical history including lifestyle, illnesses that may run in your family, and various assessments and screenings as appropriate. After reviewing your medical record and screening and assessments performed today your provider may have ordered immunizations, labs, imaging, and/or referrals for you. A list of these orders (if applicable) as well as your Preventive Care list are included within your After Visit Summary for your review. Other Preventive Recommendations:    · A preventive eye exam performed by an eye specialist is recommended every 1-2 years to screen for glaucoma; cataracts, macular degeneration, and other eye disorders. · A preventive dental visit is recommended every 6 months. · Try to get at least 150 minutes of exercise per week or 10,000 steps per day on a pedometer . · Order or download the FREE \"Exercise & Physical Activity: Your Everyday Guide\" from [employer]. Call 6-924.728.4934 or search [employer] online. · You need 1812-1366 mg of calcium and 3270-9738 IU of vitamin D per day. It is possible to meet your calcium requirement with diet alone, but a vitamin D supplement is usually necessary to meet this goal.  · When exposed to the sun, use a sunscreen that protects against both UVA and UVB radiation with an SPF of 30 or greater. Reapply every 2 to 3 hours or after sweating, drying off with a towel, or swimming. · Always wear a seat belt when traveling in a car. Always wear a helmet when riding a bicycle or motorcycle. Patient Education        Home Blood Pressure Test: About This Test  What is it?     A home blood pressure test allows you to keep track of your blood pressure at home. Blood pressure is a measure of the force of blood against the walls of your arteries. Blood pressure readings include two numbers, such as 130/80 (say \"130 over 80\"). The first number is the systolic pressure. The second number is the diastolic pressure. Why is this test done? You may do this test at home to:  Find out if you have high blood pressure. Track your blood pressure if you have high blood pressure. Track how well medicine is working to reduce high blood pressure. Check how lifestyle changes, such as weight loss and exercise, are affecting blood pressure. How can you prepare for the test?  Do not use caffeine, tobacco, or medicines known to raise blood pressure (such as nasal decongestant sprays) for at least 30 minutes before taking your blood pressure. Do not exercise for at least 30 minutes before taking your blood pressure. What happens before the test?  Take your blood pressure while you feel comfortable and relaxed. Sit quietly with both feet on the floor for at least 5 minutes before the test.  What happens during the test?  Sit with your arm slightly bent and resting on a table so that your upper arm is at the same level as your heart. Roll up your sleeve or take off your shirt to expose your upper arm. Wrap the blood pressure cuff around your upper arm so that the lower edge of the cuff is about 1 inch above the bend of your elbow. Proceed with the following steps depending on if you are using an automatic or manual pressure monitor. Automatic blood pressure monitors  Press the on/off button on the automatic monitor and wait until the ready-to-measure \"heart\" symbol appears next to zero in the display window. Press the start button. The cuff will inflate and deflate by itself. Your blood pressure numbers will appear on the screen. Write your numbers in your log book, along with the date and time.   Manual blood pressure monitors  Place the earpieces of a stethoscope in your ears, and place the bell of the stethoscope over the artery, just below the cuff. Close the valve on the rubber inflating bulb. Squeeze the bulb rapidly with your opposite hand to inflate the cuff until the dial or column of mercury reads about 30 mm Hg higher than your usual systolic pressure. If you do not know your usual pressure, inflate the cuff to 210 mm Hg or until the pulse at your wrist disappears. Open the pressure valve just slightly by twisting or pressing the valve on the bulb. As you watch the pressure slowly fall, note the level on the dial at which you first start to hear a pulsing or tapping sound through the stethoscope. This is your systolic blood pressure. Continue letting the air out slowly. The sounds will become muffled and will finally disappear. Note the pressure when the sounds completely disappear. This is your diastolic blood pressure. Let out all the remaining air. Write your numbers in your log book, along with the date and time. What else should you know about the test?  It is more accurate to take the average of several readings made throughout the day than to rely on a single reading. It's normal for blood pressure to go up and down throughout the day. Follow-up care is a key part of your treatment and safety. Be sure to make and go to all appointments, and call your doctor if you are having problems. It's also a good idea to keep a list of the medicines you take. Where can you learn more? Go to [email or web address] and sign in to your [employer] account. Enter C427 in the [facility] box to learn more about \"Home Blood Pressure Test: About This Test.\"     If you do not have an account, please click on the \"Sign Up Now\" link. Current as of: July 22, 2018  Content Version: 12.0  © 0421-3769 Healthwise, Incorporated. Care instructions adapted under license by [facility] ([facility]).  If you have questions about a medical condition or this instruction, always ask your healthcare professional. [place] [address] any warranty or liability for your use of this information.

## 2019-07-15 ENCOUNTER — OFFICE VISIT (OUTPATIENT)
Dept: FAMILY MEDICINE CLINIC | Age: 84
End: 2019-07-15
Payer: MEDICARE

## 2019-07-15 VITALS
WEIGHT: 192.8 LBS | SYSTOLIC BLOOD PRESSURE: 120 MMHG | OXYGEN SATURATION: 97 % | HEART RATE: 101 BPM | BODY MASS INDEX: 29.32 KG/M2 | DIASTOLIC BLOOD PRESSURE: 72 MMHG

## 2019-07-15 DIAGNOSIS — R05.9 COUGH: ICD-10-CM

## 2019-07-15 PROCEDURE — 1036F TOBACCO NON-USER: CPT | Performed by: NURSE PRACTITIONER

## 2019-07-15 PROCEDURE — G8417 CALC BMI ABV UP PARAM F/U: HCPCS | Performed by: NURSE PRACTITIONER

## 2019-07-15 PROCEDURE — 1123F ACP DISCUSS/DSCN MKR DOCD: CPT | Performed by: NURSE PRACTITIONER

## 2019-07-15 PROCEDURE — G8427 DOCREV CUR MEDS BY ELIG CLIN: HCPCS | Performed by: NURSE PRACTITIONER

## 2019-07-15 PROCEDURE — G8400 PT W/DXA NO RESULTS DOC: HCPCS | Performed by: NURSE PRACTITIONER

## 2019-07-15 PROCEDURE — 4040F PNEUMOC VAC/ADMIN/RCVD: CPT | Performed by: NURSE PRACTITIONER

## 2019-07-15 PROCEDURE — 1090F PRES/ABSN URINE INCON ASSESS: CPT | Performed by: NURSE PRACTITIONER

## 2019-07-15 PROCEDURE — 99213 OFFICE O/P EST LOW 20 MIN: CPT | Performed by: NURSE PRACTITIONER

## 2019-07-15 RX ORDER — AZITHROMYCIN 250 MG/1
TABLET, FILM COATED ORAL
Qty: 6 TABLET | Refills: 0 | Status: SHIPPED | OUTPATIENT
Start: 2019-07-15 | End: 2019-07-25

## 2019-07-15 ASSESSMENT — ENCOUNTER SYMPTOMS
SORE THROAT: 0
COUGH: 1
RHINORRHEA: 1
SINUS PRESSURE: 0
WHEEZING: 0
SHORTNESS OF BREATH: 1
CHEST TIGHTNESS: 0

## 2019-07-15 NOTE — PROGRESS NOTES
SUBJECTIVE:  Pt is a of 82 y.o. female comes in today with   Chief Complaint   Patient presents with    Cough     Pt states she is here for continuous cough. She's not here with chest pain. She wakes up with upset stomach          Cough   This is a new problem. Episode onset: couple of months. The problem has been unchanged. The problem occurs every few hours. The cough is productive of sputum (clear to yellow sputum). Associated symptoms include ear pain (off and on), rhinorrhea (mild) and shortness of breath (during coughing attack). Pertinent negatives include no chills, fever, headaches, sore throat or wheezing. Prior to Visit Medications    Medication Sig Taking? Authorizing Provider   Solifenacin Succinate (VESICARE PO) Take 1 tablet by mouth every morning Yes Historical Provider, MD   levothyroxine (SYNTHROID) 100 MCG tablet TAKE 1 TABLET BY MOUTH EVERY DAY Yes Roxann Valencia MD   spironolactone (ALDACTONE) 25 MG tablet TAKE 1 TABLET BY MOUTH EVERY DAY Yes Ariel Pelaez MD   Calcium Carbonate (CALCIUM 600 PO) Take by mouth Yes Historical Provider, MD   Mirabegron ER (MYRBETRIQ) 25 MG TB24 Take 1 tablet by mouth daily Yes Historical Provider, MD   allopurinol (ZYLOPRIM) 100 MG tablet Take 100 mg by mouth daily Yes Historical Provider, MD   acetaminophen (TYLENOL) 325 MG tablet Take 650 mg by mouth every 6 hours as needed. Yes Historical Provider, MD   esomeprazole (NEXIUM) 40 MG capsule Take 40 mg by mouth every morning (before breakfast). Yes Historical Provider, MD   Multiple Vitamins-Minerals (MULTIVITAMIN,TX-MINERALS) tablet Take 1 tablet by mouth daily. Yes Historical Provider, MD       Patient's past medical, surgical, social and family histories were reviewed and updated as appropriate. Review of Systems   Constitutional: Negative for chills, fatigue and fever. HENT: Positive for ear pain (off and on) and rhinorrhea (mild). Negative for congestion, sinus pressure and sore throat.

## 2019-07-15 NOTE — PATIENT INSTRUCTIONS
notice more mucus or a change in the color of your mucus.     · You have new symptoms, such as a sore throat, an earache, or sinus pain.     · You do not get better as expected. Where can you learn more? Go to [email or web address] and sign in to your [employer] account. Enter D279 in the [employer] box to learn more about \"Cough: Care Instructions. \"     If you do not have an account, please click on the \"Sign Up Now\" link. Current as of: September 5, 2018  Content Version: 12.0  © 1076-4600 Healthwise, Incorporated. Care instructions adapted under license by [facility] ([facility]). If you have questions about a medical condition or this instruction, always ask your healthcare professional. [address] any warranty or liability for your use of this information.

## 2019-07-25 ENCOUNTER — OFFICE VISIT (OUTPATIENT)
Dept: FAMILY MEDICINE CLINIC | Age: 84
End: 2019-07-25
Payer: MEDICARE

## 2019-07-25 VITALS
WEIGHT: 188.6 LBS | BODY MASS INDEX: 28.68 KG/M2 | SYSTOLIC BLOOD PRESSURE: 120 MMHG | RESPIRATION RATE: 14 BRPM | DIASTOLIC BLOOD PRESSURE: 80 MMHG | OXYGEN SATURATION: 97 % | HEART RATE: 112 BPM | TEMPERATURE: 98.2 F

## 2019-07-25 DIAGNOSIS — R05.9 COUGH: Primary | ICD-10-CM

## 2019-07-25 DIAGNOSIS — R11.0 NAUSEA: ICD-10-CM

## 2019-07-25 DIAGNOSIS — N18.4 STAGE 4 CHRONIC KIDNEY DISEASE (HCC): ICD-10-CM

## 2019-07-25 PROCEDURE — G8400 PT W/DXA NO RESULTS DOC: HCPCS | Performed by: NURSE PRACTITIONER

## 2019-07-25 PROCEDURE — G8417 CALC BMI ABV UP PARAM F/U: HCPCS | Performed by: NURSE PRACTITIONER

## 2019-07-25 PROCEDURE — 1090F PRES/ABSN URINE INCON ASSESS: CPT | Performed by: NURSE PRACTITIONER

## 2019-07-25 PROCEDURE — 4040F PNEUMOC VAC/ADMIN/RCVD: CPT | Performed by: NURSE PRACTITIONER

## 2019-07-25 PROCEDURE — 1036F TOBACCO NON-USER: CPT | Performed by: NURSE PRACTITIONER

## 2019-07-25 PROCEDURE — 1123F ACP DISCUSS/DSCN MKR DOCD: CPT | Performed by: NURSE PRACTITIONER

## 2019-07-25 PROCEDURE — 99213 OFFICE O/P EST LOW 20 MIN: CPT | Performed by: NURSE PRACTITIONER

## 2019-07-25 PROCEDURE — G8427 DOCREV CUR MEDS BY ELIG CLIN: HCPCS | Performed by: NURSE PRACTITIONER

## 2019-07-25 RX ORDER — BENZONATATE 100 MG/1
100-200 CAPSULE ORAL 3 TIMES DAILY PRN
Qty: 30 CAPSULE | Refills: 0 | Status: SHIPPED | OUTPATIENT
Start: 2019-07-25 | End: 2019-07-29 | Stop reason: SDUPTHER

## 2019-07-25 RX ORDER — ONDANSETRON 4 MG/1
4 TABLET, ORALLY DISINTEGRATING ORAL 3 TIMES DAILY PRN
Qty: 21 TABLET | Refills: 0 | Status: SHIPPED | OUTPATIENT
Start: 2019-07-25 | End: 2019-07-29 | Stop reason: SDUPTHER

## 2019-07-25 ASSESSMENT — ENCOUNTER SYMPTOMS
NAUSEA: 1
SORE THROAT: 0
SHORTNESS OF BREATH: 1
COUGH: 1
VOMITING: 0
WHEEZING: 0
RHINORRHEA: 0
ABDOMINAL PAIN: 0
CHEST TIGHTNESS: 0

## 2019-07-25 NOTE — PROGRESS NOTES
SUBJECTIVE:  Pt is a of 82 y.o. female comes in today with   Chief Complaint   Patient presents with    Cough     pt states the cough is going away but still sick to stomach when she wakes up           Initial eval with me 7/15/19 for same. Completed Zpak and states cough is improving. No longer coughing constantly, having intermittent coughing fits. Cough   This is a new problem. The current episode started more than 1 month ago. The problem has been gradually improving. The problem occurs every few hours. The cough is non-productive. Associated symptoms include headaches, postnasal drip and shortness of breath (during cough). Pertinent negatives include no chills, ear pain, fever, rhinorrhea, sore throat or wheezing. Prior to Visit Medications    Medication Sig Taking? Authorizing Provider   azithromycin (ZITHROMAX Z-NAOMIE) 250 MG tablet Take 2 tablets daily on day 1, then 1 tablet daily on days 2-5. Martina Hopper, APRN - CNP   Solifenacin Succinate (VESICARE PO) Take 1 tablet by mouth every morning  Historical Provider, MD   levothyroxine (SYNTHROID) 100 MCG tablet TAKE 1 TABLET BY MOUTH EVERY DAY  Tone Wilhelm MD   spironolactone (ALDACTONE) 25 MG tablet TAKE 1 TABLET BY Girish Adler MD   Calcium Carbonate (CALCIUM 600 PO) Take by mouth  Historical Provider, MD   Mirabegron ER (MYRBETRIQ) 25 MG TB24 Take 1 tablet by mouth daily  Historical Provider, MD   allopurinol (ZYLOPRIM) 100 MG tablet Take 100 mg by mouth daily  Historical Provider, MD   acetaminophen (TYLENOL) 325 MG tablet Take 650 mg by mouth every 6 hours as needed. Historical Provider, MD   esomeprazole (NEXIUM) 40 MG capsule Take 40 mg by mouth every morning (before breakfast). Historical Provider, MD   Multiple Vitamins-Minerals (MULTIVITAMIN,TX-MINERALS) tablet Take 1 tablet by mouth daily.     Historical Provider, MD       Patient's past medical, surgical, social and family histories were reviewed and updated

## 2019-07-29 DIAGNOSIS — R11.0 NAUSEA: ICD-10-CM

## 2019-07-29 DIAGNOSIS — R05.9 COUGH: ICD-10-CM

## 2019-07-29 RX ORDER — FLUTICASONE FUROATE AND VILANTEROL 100; 25 UG/1; UG/1
1 POWDER RESPIRATORY (INHALATION) DAILY
Qty: 30 EACH | Refills: 5 | Status: SHIPPED | OUTPATIENT
Start: 2019-07-29 | End: 2020-01-25

## 2019-07-29 RX ORDER — BENZONATATE 100 MG/1
100-200 CAPSULE ORAL 3 TIMES DAILY PRN
Qty: 30 CAPSULE | Refills: 0 | Status: SHIPPED | OUTPATIENT
Start: 2019-07-29 | End: 2019-08-08

## 2019-07-29 RX ORDER — ONDANSETRON 4 MG/1
4 TABLET, ORALLY DISINTEGRATING ORAL 3 TIMES DAILY PRN
Qty: 21 TABLET | Refills: 0 | Status: ON HOLD | OUTPATIENT
Start: 2019-07-29 | End: 2019-08-14 | Stop reason: SDUPTHER

## 2019-07-31 ENCOUNTER — TELEPHONE (OUTPATIENT)
Dept: FAMILY MEDICINE CLINIC | Age: 84
End: 2019-07-31

## 2019-08-01 NOTE — TELEPHONE ENCOUNTER
Tried to reach Isiah noonan at both numbers given. Unable to leave message. Will try again tomorrow. Need advise on how she would like me to approach phone call with her mother.

## 2019-08-07 ENCOUNTER — TELEPHONE (OUTPATIENT)
Dept: FAMILY MEDICINE CLINIC | Age: 84
End: 2019-08-07

## 2019-08-11 ENCOUNTER — APPOINTMENT (OUTPATIENT)
Dept: GENERAL RADIOLOGY | Age: 84
DRG: 180 | End: 2019-08-11
Payer: MEDICARE

## 2019-08-11 ENCOUNTER — HOSPITAL ENCOUNTER (INPATIENT)
Age: 84
LOS: 3 days | Discharge: HOME HEALTH | DRG: 180 | End: 2019-08-14
Attending: EMERGENCY MEDICINE | Admitting: INTERNAL MEDICINE
Payer: MEDICARE

## 2019-08-11 ENCOUNTER — APPOINTMENT (OUTPATIENT)
Dept: CT IMAGING | Age: 84
DRG: 180 | End: 2019-08-11
Payer: MEDICARE

## 2019-08-11 DIAGNOSIS — R06.89 DYSPNEA AND RESPIRATORY ABNORMALITIES: Primary | ICD-10-CM

## 2019-08-11 DIAGNOSIS — R06.00 DYSPNEA AND RESPIRATORY ABNORMALITIES: Primary | ICD-10-CM

## 2019-08-11 DIAGNOSIS — R63.4 WEIGHT LOSS, ABNORMAL: ICD-10-CM

## 2019-08-11 DIAGNOSIS — J90 PLEURAL EFFUSION: ICD-10-CM

## 2019-08-11 DIAGNOSIS — R91.8 LUNG MASS: ICD-10-CM

## 2019-08-11 DIAGNOSIS — J18.9 PNEUMONIA DUE TO ORGANISM: ICD-10-CM

## 2019-08-11 DIAGNOSIS — I31.39 PERICARDIAL EFFUSION: ICD-10-CM

## 2019-08-11 DIAGNOSIS — R11.0 NAUSEA: ICD-10-CM

## 2019-08-11 LAB
ANION GAP SERPL CALCULATED.3IONS-SCNC: 11 MMOL/L (ref 3–16)
BASOPHILS ABSOLUTE: 0 K/UL (ref 0–0.2)
BASOPHILS RELATIVE PERCENT: 0.4 %
BUN BLDV-MCNC: 35 MG/DL (ref 7–20)
CALCIUM SERPL-MCNC: 10.7 MG/DL (ref 8.3–10.6)
CHLORIDE BLD-SCNC: 102 MMOL/L (ref 99–110)
CO2: 24 MMOL/L (ref 21–32)
CREAT SERPL-MCNC: 1.6 MG/DL (ref 0.6–1.2)
EOSINOPHILS ABSOLUTE: 0.1 K/UL (ref 0–0.6)
EOSINOPHILS RELATIVE PERCENT: 0.8 %
GFR AFRICAN AMERICAN: 37
GFR NON-AFRICAN AMERICAN: 31
GLUCOSE BLD-MCNC: 105 MG/DL (ref 70–99)
HCT VFR BLD CALC: 47.6 % (ref 36–48)
HEMOGLOBIN: 15.1 G/DL (ref 12–16)
LYMPHOCYTES ABSOLUTE: 0.4 K/UL (ref 1–5.1)
LYMPHOCYTES RELATIVE PERCENT: 5.9 %
MCH RBC QN AUTO: 29.1 PG (ref 26–34)
MCHC RBC AUTO-ENTMCNC: 31.8 G/DL (ref 31–36)
MCV RBC AUTO: 91.6 FL (ref 80–100)
MONOCYTES ABSOLUTE: 0.5 K/UL (ref 0–1.3)
MONOCYTES RELATIVE PERCENT: 6.7 %
NEUTROPHILS ABSOLUTE: 6.5 K/UL (ref 1.7–7.7)
NEUTROPHILS RELATIVE PERCENT: 86.2 %
PDW BLD-RTO: 15.4 % (ref 12.4–15.4)
PLATELET # BLD: 167 K/UL (ref 135–450)
PMV BLD AUTO: 10.7 FL (ref 5–10.5)
POTASSIUM SERPL-SCNC: 4.7 MMOL/L (ref 3.5–5.1)
PRO-BNP: 556 PG/ML (ref 0–449)
RBC # BLD: 5.19 M/UL (ref 4–5.2)
SODIUM BLD-SCNC: 137 MMOL/L (ref 136–145)
TROPONIN: <0.01 NG/ML
WBC # BLD: 7.5 K/UL (ref 4–11)

## 2019-08-11 PROCEDURE — 6370000000 HC RX 637 (ALT 250 FOR IP): Performed by: FAMILY MEDICINE

## 2019-08-11 PROCEDURE — 99285 EMERGENCY DEPT VISIT HI MDM: CPT

## 2019-08-11 PROCEDURE — 83880 ASSAY OF NATRIURETIC PEPTIDE: CPT

## 2019-08-11 PROCEDURE — 80048 BASIC METABOLIC PNL TOTAL CA: CPT

## 2019-08-11 PROCEDURE — 94760 N-INVAS EAR/PLS OXIMETRY 1: CPT

## 2019-08-11 PROCEDURE — 6360000002 HC RX W HCPCS: Performed by: EMERGENCY MEDICINE

## 2019-08-11 PROCEDURE — 84484 ASSAY OF TROPONIN QUANT: CPT

## 2019-08-11 PROCEDURE — 2580000003 HC RX 258: Performed by: FAMILY MEDICINE

## 2019-08-11 PROCEDURE — 71046 X-RAY EXAM CHEST 2 VIEWS: CPT

## 2019-08-11 PROCEDURE — 85025 COMPLETE CBC W/AUTO DIFF WBC: CPT

## 2019-08-11 PROCEDURE — 94640 AIRWAY INHALATION TREATMENT: CPT

## 2019-08-11 PROCEDURE — 1200000000 HC SEMI PRIVATE

## 2019-08-11 PROCEDURE — 71250 CT THORAX DX C-: CPT

## 2019-08-11 PROCEDURE — 93005 ELECTROCARDIOGRAM TRACING: CPT | Performed by: EMERGENCY MEDICINE

## 2019-08-11 RX ORDER — ACETAMINOPHEN 325 MG/1
650 TABLET ORAL EVERY 6 HOURS PRN
Status: DISCONTINUED | OUTPATIENT
Start: 2019-08-11 | End: 2019-08-14 | Stop reason: HOSPADM

## 2019-08-11 RX ORDER — ESOMEPRAZOLE MAGNESIUM 40 MG/1
40 CAPSULE, DELAYED RELEASE ORAL
Status: DISCONTINUED | OUTPATIENT
Start: 2019-08-12 | End: 2019-08-11 | Stop reason: CLARIF

## 2019-08-11 RX ORDER — FLUTICASONE FUROATE AND VILANTEROL 100; 25 UG/1; UG/1
1 POWDER RESPIRATORY (INHALATION) DAILY
Status: DISCONTINUED | OUTPATIENT
Start: 2019-08-12 | End: 2019-08-11 | Stop reason: CLARIF

## 2019-08-11 RX ORDER — SODIUM CHLORIDE 0.9 % (FLUSH) 0.9 %
10 SYRINGE (ML) INJECTION EVERY 12 HOURS SCHEDULED
Status: DISCONTINUED | OUTPATIENT
Start: 2019-08-11 | End: 2019-08-14 | Stop reason: HOSPADM

## 2019-08-11 RX ORDER — ALLOPURINOL 100 MG/1
100 TABLET ORAL DAILY
Status: DISCONTINUED | OUTPATIENT
Start: 2019-08-12 | End: 2019-08-14 | Stop reason: HOSPADM

## 2019-08-11 RX ORDER — ONDANSETRON 4 MG/1
4 TABLET, ORALLY DISINTEGRATING ORAL 3 TIMES DAILY PRN
Status: DISCONTINUED | OUTPATIENT
Start: 2019-08-11 | End: 2019-08-14 | Stop reason: HOSPADM

## 2019-08-11 RX ORDER — LEVOTHYROXINE SODIUM 0.1 MG/1
100 TABLET ORAL DAILY
Status: DISCONTINUED | OUTPATIENT
Start: 2019-08-12 | End: 2019-08-14 | Stop reason: HOSPADM

## 2019-08-11 RX ORDER — LEVOFLOXACIN 5 MG/ML
750 INJECTION, SOLUTION INTRAVENOUS
Status: DISCONTINUED | OUTPATIENT
Start: 2019-08-13 | End: 2019-08-12

## 2019-08-11 RX ORDER — ACETAMINOPHEN 325 MG/1
650 TABLET ORAL EVERY 4 HOURS PRN
Status: DISCONTINUED | OUTPATIENT
Start: 2019-08-11 | End: 2019-08-11 | Stop reason: SDUPTHER

## 2019-08-11 RX ORDER — SODIUM CHLORIDE 0.9 % (FLUSH) 0.9 %
10 SYRINGE (ML) INJECTION PRN
Status: DISCONTINUED | OUTPATIENT
Start: 2019-08-11 | End: 2019-08-14 | Stop reason: HOSPADM

## 2019-08-11 RX ORDER — ONDANSETRON 2 MG/ML
4 INJECTION INTRAMUSCULAR; INTRAVENOUS EVERY 6 HOURS PRN
Status: DISCONTINUED | OUTPATIENT
Start: 2019-08-11 | End: 2019-08-14 | Stop reason: HOSPADM

## 2019-08-11 RX ORDER — SODIUM CHLORIDE 9 MG/ML
INJECTION, SOLUTION INTRAVENOUS CONTINUOUS
Status: DISCONTINUED | OUTPATIENT
Start: 2019-08-11 | End: 2019-08-13

## 2019-08-11 RX ORDER — IPRATROPIUM BROMIDE AND ALBUTEROL SULFATE 2.5; .5 MG/3ML; MG/3ML
1 SOLUTION RESPIRATORY (INHALATION)
Status: DISCONTINUED | OUTPATIENT
Start: 2019-08-11 | End: 2019-08-14 | Stop reason: HOSPADM

## 2019-08-11 RX ORDER — LEVOFLOXACIN 5 MG/ML
750 INJECTION, SOLUTION INTRAVENOUS EVERY 24 HOURS
Status: DISCONTINUED | OUTPATIENT
Start: 2019-08-11 | End: 2019-08-11 | Stop reason: ALTCHOICE

## 2019-08-11 RX ADMIN — Medication 10 ML: at 22:12

## 2019-08-11 RX ADMIN — SODIUM CHLORIDE: 9 INJECTION, SOLUTION INTRAVENOUS at 22:11

## 2019-08-11 RX ADMIN — LEVOFLOXACIN 750 MG: 5 INJECTION, SOLUTION INTRAVENOUS at 17:50

## 2019-08-11 RX ADMIN — IPRATROPIUM BROMIDE AND ALBUTEROL SULFATE 1 AMPULE: .5; 3 SOLUTION RESPIRATORY (INHALATION) at 20:22

## 2019-08-11 ASSESSMENT — PAIN SCALES - GENERAL: PAINLEVEL_OUTOF10: 0

## 2019-08-12 ENCOUNTER — ANESTHESIA (OUTPATIENT)
Dept: ENDOSCOPY | Age: 84
DRG: 180 | End: 2019-08-12
Payer: MEDICARE

## 2019-08-12 ENCOUNTER — ANESTHESIA EVENT (OUTPATIENT)
Dept: ENDOSCOPY | Age: 84
DRG: 180 | End: 2019-08-12
Payer: MEDICARE

## 2019-08-12 ENCOUNTER — TELEPHONE (OUTPATIENT)
Dept: FAMILY MEDICINE CLINIC | Age: 84
End: 2019-08-12

## 2019-08-12 VITALS
SYSTOLIC BLOOD PRESSURE: 161 MMHG | RESPIRATION RATE: 3 BRPM | OXYGEN SATURATION: 100 % | DIASTOLIC BLOOD PRESSURE: 92 MMHG

## 2019-08-12 PROBLEM — R59.0 HILAR ADENOPATHY: Status: ACTIVE | Noted: 2019-08-12

## 2019-08-12 PROBLEM — Z87.891 FORMER SMOKER: Status: ACTIVE | Noted: 2019-08-12

## 2019-08-12 PROBLEM — J18.9 PNEUMONIA: Status: ACTIVE | Noted: 2019-08-12

## 2019-08-12 PROBLEM — I31.39 PERICARDIAL EFFUSION: Status: ACTIVE | Noted: 2019-08-12

## 2019-08-12 PROBLEM — R06.02 SOB (SHORTNESS OF BREATH): Status: ACTIVE | Noted: 2019-08-12

## 2019-08-12 PROBLEM — J98.11 ATELECTASIS: Status: ACTIVE | Noted: 2019-08-12

## 2019-08-12 PROBLEM — J90 PLEURAL EFFUSION: Status: ACTIVE | Noted: 2019-08-12

## 2019-08-12 PROBLEM — R59.0 MEDIASTINAL ADENOPATHY: Status: ACTIVE | Noted: 2019-08-12

## 2019-08-12 PROBLEM — R63.4 ABNORMAL WEIGHT LOSS: Status: ACTIVE | Noted: 2019-08-12

## 2019-08-12 LAB
ALBUMIN SERPL-MCNC: 3.7 G/DL (ref 3.4–5)
ANION GAP SERPL CALCULATED.3IONS-SCNC: 13 MMOL/L (ref 3–16)
BUN BLDV-MCNC: 32 MG/DL (ref 7–20)
CALCIUM SERPL-MCNC: 10 MG/DL (ref 8.3–10.6)
CHLORIDE BLD-SCNC: 105 MMOL/L (ref 99–110)
CO2: 23 MMOL/L (ref 21–32)
CREAT SERPL-MCNC: 1.6 MG/DL (ref 0.6–1.2)
EKG ATRIAL RATE: 98 BPM
EKG DIAGNOSIS: NORMAL
EKG P AXIS: -5 DEGREES
EKG P-R INTERVAL: 210 MS
EKG Q-T INTERVAL: 376 MS
EKG QRS DURATION: 144 MS
EKG QTC CALCULATION (BAZETT): 480 MS
EKG R AXIS: -81 DEGREES
EKG T AXIS: -8 DEGREES
EKG VENTRICULAR RATE: 98 BPM
GFR AFRICAN AMERICAN: 37
GFR NON-AFRICAN AMERICAN: 31
GLUCOSE BLD-MCNC: 83 MG/DL (ref 70–99)
LV EF: 58 %
LVEF MODALITY: NORMAL
PHOSPHORUS: 3 MG/DL (ref 2.5–4.9)
POTASSIUM SERPL-SCNC: 4.5 MMOL/L (ref 3.5–5.1)
SODIUM BLD-SCNC: 141 MMOL/L (ref 136–145)

## 2019-08-12 PROCEDURE — 7100000000 HC PACU RECOVERY - FIRST 15 MIN: Performed by: INTERNAL MEDICINE

## 2019-08-12 PROCEDURE — 2580000003 HC RX 258: Performed by: FAMILY MEDICINE

## 2019-08-12 PROCEDURE — 0B9C8ZX DRAINAGE OF RIGHT UPPER LUNG LOBE, VIA NATURAL OR ARTIFICIAL OPENING ENDOSCOPIC, DIAGNOSTIC: ICD-10-PCS | Performed by: INTERNAL MEDICINE

## 2019-08-12 PROCEDURE — 88173 CYTOPATH EVAL FNA REPORT: CPT

## 2019-08-12 PROCEDURE — 3603165200 HC BRNCHSC EBUS GUIDED SAMPL 1/2 NODE STATION/STRUX: Performed by: INTERNAL MEDICINE

## 2019-08-12 PROCEDURE — 88305 TISSUE EXAM BY PATHOLOGIST: CPT

## 2019-08-12 PROCEDURE — 94640 AIRWAY INHALATION TREATMENT: CPT

## 2019-08-12 PROCEDURE — 2500000003 HC RX 250 WO HCPCS: Performed by: INTERNAL MEDICINE

## 2019-08-12 PROCEDURE — 2500000003 HC RX 250 WO HCPCS: Performed by: NURSE ANESTHETIST, CERTIFIED REGISTERED

## 2019-08-12 PROCEDURE — C1725 CATH, TRANSLUMIN NON-LASER: HCPCS | Performed by: INTERNAL MEDICINE

## 2019-08-12 PROCEDURE — 6370000000 HC RX 637 (ALT 250 FOR IP): Performed by: FAMILY MEDICINE

## 2019-08-12 PROCEDURE — 3700000001 HC ADD 15 MINUTES (ANESTHESIA): Performed by: INTERNAL MEDICINE

## 2019-08-12 PROCEDURE — 88172 CYTP DX EVAL FNA 1ST EA SITE: CPT

## 2019-08-12 PROCEDURE — 31624 DX BRONCHOSCOPE/LAVAGE: CPT | Performed by: INTERNAL MEDICINE

## 2019-08-12 PROCEDURE — 80069 RENAL FUNCTION PANEL: CPT

## 2019-08-12 PROCEDURE — 2709999900 HC NON-CHARGEABLE SUPPLY: Performed by: INTERNAL MEDICINE

## 2019-08-12 PROCEDURE — 88112 CYTOPATH CELL ENHANCE TECH: CPT

## 2019-08-12 PROCEDURE — 88342 IMHCHEM/IMCYTCHM 1ST ANTB: CPT

## 2019-08-12 PROCEDURE — 99223 1ST HOSP IP/OBS HIGH 75: CPT | Performed by: INTERNAL MEDICINE

## 2019-08-12 PROCEDURE — 07D78ZX EXTRACTION OF THORAX LYMPHATIC, VIA NATURAL OR ARTIFICIAL OPENING ENDOSCOPIC, DIAGNOSTIC: ICD-10-PCS | Performed by: INTERNAL MEDICINE

## 2019-08-12 PROCEDURE — 3609020000 HC BRONCHOSCOPY W/EBUS FNA: Performed by: INTERNAL MEDICINE

## 2019-08-12 PROCEDURE — 6360000002 HC RX W HCPCS: Performed by: NURSE ANESTHETIST, CERTIFIED REGISTERED

## 2019-08-12 PROCEDURE — 88341 IMHCHEM/IMCYTCHM EA ADD ANTB: CPT

## 2019-08-12 PROCEDURE — 7100000001 HC PACU RECOVERY - ADDTL 15 MIN: Performed by: INTERNAL MEDICINE

## 2019-08-12 PROCEDURE — 88177 CYTP FNA EVAL EA ADDL: CPT

## 2019-08-12 PROCEDURE — 31652 BRONCH EBUS SAMPLNG 1/2 NODE: CPT | Performed by: INTERNAL MEDICINE

## 2019-08-12 PROCEDURE — 3700000000 HC ANESTHESIA ATTENDED CARE: Performed by: INTERNAL MEDICINE

## 2019-08-12 PROCEDURE — 1200000000 HC SEMI PRIVATE

## 2019-08-12 PROCEDURE — 36415 COLL VENOUS BLD VENIPUNCTURE: CPT

## 2019-08-12 PROCEDURE — 93306 TTE W/DOPPLER COMPLETE: CPT

## 2019-08-12 PROCEDURE — 31623 DX BRONCHOSCOPE/BRUSH: CPT | Performed by: INTERNAL MEDICINE

## 2019-08-12 PROCEDURE — 93010 ELECTROCARDIOGRAM REPORT: CPT | Performed by: INTERNAL MEDICINE

## 2019-08-12 RX ORDER — ONDANSETRON 2 MG/ML
INJECTION INTRAMUSCULAR; INTRAVENOUS PRN
Status: DISCONTINUED | OUTPATIENT
Start: 2019-08-12 | End: 2019-08-12 | Stop reason: SDUPTHER

## 2019-08-12 RX ORDER — LIDOCAINE HYDROCHLORIDE 40 MG/ML
SOLUTION TOPICAL ONCE
Status: DISCONTINUED | OUTPATIENT
Start: 2019-08-12 | End: 2019-08-14 | Stop reason: HOSPADM

## 2019-08-12 RX ORDER — DEXAMETHASONE SODIUM PHOSPHATE 4 MG/ML
INJECTION, SOLUTION INTRA-ARTICULAR; INTRALESIONAL; INTRAMUSCULAR; INTRAVENOUS; SOFT TISSUE PRN
Status: DISCONTINUED | OUTPATIENT
Start: 2019-08-12 | End: 2019-08-12 | Stop reason: SDUPTHER

## 2019-08-12 RX ORDER — SODIUM CHLORIDE 0.9 % (FLUSH) 0.9 %
10 SYRINGE (ML) INJECTION EVERY 12 HOURS SCHEDULED
Status: DISCONTINUED | OUTPATIENT
Start: 2019-08-12 | End: 2019-08-12 | Stop reason: HOSPADM

## 2019-08-12 RX ORDER — LIDOCAINE HYDROCHLORIDE 20 MG/ML
INJECTION, SOLUTION EPIDURAL; INFILTRATION; INTRACAUDAL; PERINEURAL PRN
Status: DISCONTINUED | OUTPATIENT
Start: 2019-08-12 | End: 2019-08-12 | Stop reason: SDUPTHER

## 2019-08-12 RX ORDER — PROPOFOL 10 MG/ML
INJECTION, EMULSION INTRAVENOUS PRN
Status: DISCONTINUED | OUTPATIENT
Start: 2019-08-12 | End: 2019-08-12 | Stop reason: SDUPTHER

## 2019-08-12 RX ORDER — PROPOFOL 10 MG/ML
INJECTION, EMULSION INTRAVENOUS CONTINUOUS PRN
Status: DISCONTINUED | OUTPATIENT
Start: 2019-08-12 | End: 2019-08-12 | Stop reason: SDUPTHER

## 2019-08-12 RX ORDER — FENTANYL CITRATE 50 UG/ML
INJECTION, SOLUTION INTRAMUSCULAR; INTRAVENOUS PRN
Status: DISCONTINUED | OUTPATIENT
Start: 2019-08-12 | End: 2019-08-12 | Stop reason: SDUPTHER

## 2019-08-12 RX ORDER — SODIUM CHLORIDE 9 MG/ML
INJECTION, SOLUTION INTRAVENOUS CONTINUOUS
Status: DISCONTINUED | OUTPATIENT
Start: 2019-08-12 | End: 2019-08-12

## 2019-08-12 RX ORDER — SODIUM CHLORIDE 0.9 % (FLUSH) 0.9 %
10 SYRINGE (ML) INJECTION PRN
Status: DISCONTINUED | OUTPATIENT
Start: 2019-08-12 | End: 2019-08-12 | Stop reason: HOSPADM

## 2019-08-12 RX ORDER — GLYCOPYRROLATE 0.2 MG/ML
0.2 INJECTION INTRAMUSCULAR; INTRAVENOUS ONCE
Status: DISCONTINUED | OUTPATIENT
Start: 2019-08-12 | End: 2019-08-12

## 2019-08-12 RX ORDER — PHENYLEPHRINE HCL IN 0.9% NACL 1 MG/10 ML
SYRINGE (ML) INTRAVENOUS PRN
Status: DISCONTINUED | OUTPATIENT
Start: 2019-08-12 | End: 2019-08-12 | Stop reason: SDUPTHER

## 2019-08-12 RX ADMIN — PROPOFOL 150 MG: 10 INJECTION, EMULSION INTRAVENOUS at 14:33

## 2019-08-12 RX ADMIN — FENTANYL CITRATE 25 MCG: 50 INJECTION, SOLUTION INTRAMUSCULAR; INTRAVENOUS at 14:35

## 2019-08-12 RX ADMIN — IPRATROPIUM BROMIDE AND ALBUTEROL SULFATE 1 AMPULE: .5; 3 SOLUTION RESPIRATORY (INHALATION) at 20:11

## 2019-08-12 RX ADMIN — Medication 10 ML: at 10:21

## 2019-08-12 RX ADMIN — LEVOTHYROXINE SODIUM 100 MCG: 100 TABLET ORAL at 07:03

## 2019-08-12 RX ADMIN — Medication 100 MCG: at 14:51

## 2019-08-12 RX ADMIN — DEXAMETHASONE SODIUM PHOSPHATE 4 MG: 4 INJECTION, SOLUTION INTRAMUSCULAR; INTRAVENOUS at 14:44

## 2019-08-12 RX ADMIN — SODIUM CHLORIDE: 9 INJECTION, SOLUTION INTRAVENOUS at 10:21

## 2019-08-12 RX ADMIN — SODIUM CHLORIDE: 9 INJECTION, SOLUTION INTRAVENOUS at 14:19

## 2019-08-12 RX ADMIN — LIDOCAINE HYDROCHLORIDE 60 MG: 20 INJECTION, SOLUTION EPIDURAL; INFILTRATION; INTRACAUDAL; PERINEURAL at 14:33

## 2019-08-12 RX ADMIN — TAZOBACTAM SODIUM AND PIPERACILLIN SODIUM 3.38 G: 375; 3 INJECTION, SOLUTION INTRAVENOUS at 23:42

## 2019-08-12 RX ADMIN — PROPOFOL 30 MG: 10 INJECTION, EMULSION INTRAVENOUS at 14:36

## 2019-08-12 RX ADMIN — Medication 100 MCG: at 14:55

## 2019-08-12 RX ADMIN — ONDANSETRON 4 MG: 2 INJECTION INTRAMUSCULAR; INTRAVENOUS at 14:44

## 2019-08-12 RX ADMIN — IPRATROPIUM BROMIDE AND ALBUTEROL SULFATE 1 AMPULE: .5; 3 SOLUTION RESPIRATORY (INHALATION) at 11:02

## 2019-08-12 RX ADMIN — IPRATROPIUM BROMIDE AND ALBUTEROL SULFATE 1 AMPULE: .5; 3 SOLUTION RESPIRATORY (INHALATION) at 07:25

## 2019-08-12 RX ADMIN — PROPOFOL 100 MCG/KG/MIN: 10 INJECTION, EMULSION INTRAVENOUS at 14:33

## 2019-08-12 RX ADMIN — Medication 100 MCG: at 14:40

## 2019-08-12 RX ADMIN — Medication 100 MCG: at 14:46

## 2019-08-12 RX ADMIN — Medication 2 PUFF: at 07:25

## 2019-08-12 ASSESSMENT — PULMONARY FUNCTION TESTS
PIF_VALUE: 0
PIF_VALUE: 17
PIF_VALUE: 15
PIF_VALUE: 14
PIF_VALUE: 21
PIF_VALUE: 0
PIF_VALUE: 15
PIF_VALUE: 16
PIF_VALUE: 15
PIF_VALUE: 0
PIF_VALUE: 15
PIF_VALUE: 9
PIF_VALUE: 7
PIF_VALUE: 17
PIF_VALUE: 17
PIF_VALUE: 20
PIF_VALUE: 15
PIF_VALUE: 0
PIF_VALUE: 18
PIF_VALUE: 0
PIF_VALUE: 15
PIF_VALUE: 0
PIF_VALUE: 0
PIF_VALUE: 15
PIF_VALUE: 16
PIF_VALUE: 16
PIF_VALUE: 17
PIF_VALUE: 0
PIF_VALUE: 19
PIF_VALUE: 2
PIF_VALUE: 15
PIF_VALUE: 0
PIF_VALUE: 0
PIF_VALUE: 19
PIF_VALUE: 19
PIF_VALUE: 3
PIF_VALUE: 16
PIF_VALUE: 21
PIF_VALUE: 15
PIF_VALUE: 19
PIF_VALUE: 0
PIF_VALUE: 10
PIF_VALUE: 17
PIF_VALUE: 16
PIF_VALUE: 0
PIF_VALUE: 17

## 2019-08-12 ASSESSMENT — PAIN - FUNCTIONAL ASSESSMENT: PAIN_FUNCTIONAL_ASSESSMENT: 0-10

## 2019-08-12 ASSESSMENT — PAIN SCALES - GENERAL: PAINLEVEL_OUTOF10: 0

## 2019-08-12 ASSESSMENT — ENCOUNTER SYMPTOMS: SHORTNESS OF BREATH: 1

## 2019-08-12 NOTE — ANESTHESIA PRE PROCEDURE
Comment: rare                                Counseling given: Not Answered      Vital Signs (Current):   Vitals:    08/12/19 0511 08/12/19 0630 08/12/19 0725 08/12/19 1015   BP: (!) 147/82   (!) 132/93   Pulse: 95   87   Resp: 22 18 18 18   Temp: 98.1 °F (36.7 °C)   98.4 °F (36.9 °C)   TempSrc: Oral   Oral   SpO2: 95%  92% 94%   Weight: 183 lb 6.4 oz (83.2 kg)      Height:                                                  BP Readings from Last 3 Encounters:   08/12/19 (!) 132/93   07/25/19 120/80   07/15/19 120/72       NPO Status:                                                                                 BMI:   Wt Readings from Last 3 Encounters:   08/12/19 183 lb 6.4 oz (83.2 kg)   07/25/19 188 lb 9.6 oz (85.5 kg)   07/15/19 192 lb 12.8 oz (87.5 kg)     Body mass index is 27.89 kg/m². CBC:   Lab Results   Component Value Date    WBC 7.5 08/11/2019    RBC 5.19 08/11/2019    HGB 15.1 08/11/2019    HCT 47.6 08/11/2019    MCV 91.6 08/11/2019    RDW 15.4 08/11/2019     08/11/2019       CMP:   Lab Results   Component Value Date     08/11/2019    K 4.7 08/11/2019     08/11/2019    CO2 24 08/11/2019    BUN 35 08/11/2019    CREATININE 1.6 08/11/2019    GFRAA 37 08/11/2019    LABGLOM 31 08/11/2019    GLUCOSE 105 08/11/2019    CALCIUM 10.7 08/11/2019       POC Tests: No results for input(s): POCGLU, POCNA, POCK, POCCL, POCBUN, POCHEMO, POCHCT in the last 72 hours.     Coags:   Lab Results   Component Value Date    PROTIME 10.6 09/25/2013    INR 0.94 09/25/2013    APTT 30.0 09/25/2013       HCG (If Applicable): No results found for: PREGTESTUR, PREGSERUM, HCG, HCGQUANT     ABGs: No results found for: PHART, PO2ART, AMG7WNT, JIF2XZN, BEART, M1PVCWVT     Type & Screen (If Applicable):  No results found for: LABABO, LABRH    Anesthesia Evaluation    Airway: Mallampati: II  TM distance: >3 FB   Neck ROM: full  Mouth opening: > = 3 FB Dental:          Pulmonary:   (+) COPD:  shortness of breath:

## 2019-08-12 NOTE — TELEPHONE ENCOUNTER
Salma Srinivasan with [facility]  Her office is in the [facility].   Phone #[phone]

## 2019-08-12 NOTE — TELEPHONE ENCOUNTER
Asking for recommendation for an oncologist.  Deysi Kilpatrick Daughter Edgardo Mahoney) [phone] or ANABEL #[phone]

## 2019-08-13 ENCOUNTER — APPOINTMENT (OUTPATIENT)
Dept: CT IMAGING | Age: 84
DRG: 180 | End: 2019-08-13
Payer: MEDICARE

## 2019-08-13 PROBLEM — E44.0 MODERATE MALNUTRITION (HCC): Chronic | Status: ACTIVE | Noted: 2019-08-13

## 2019-08-13 LAB
ANION GAP SERPL CALCULATED.3IONS-SCNC: 11 MMOL/L (ref 3–16)
BASOPHILS ABSOLUTE: 0 K/UL (ref 0–0.2)
BASOPHILS RELATIVE PERCENT: 0.1 %
BUN BLDV-MCNC: 31 MG/DL (ref 7–20)
CALCIUM SERPL-MCNC: 9.9 MG/DL (ref 8.3–10.6)
CHLORIDE BLD-SCNC: 107 MMOL/L (ref 99–110)
CO2: 24 MMOL/L (ref 21–32)
CREAT SERPL-MCNC: 1.6 MG/DL (ref 0.6–1.2)
EOSINOPHILS ABSOLUTE: 0 K/UL (ref 0–0.6)
EOSINOPHILS RELATIVE PERCENT: 0 %
GFR AFRICAN AMERICAN: 37
GFR NON-AFRICAN AMERICAN: 31
GLUCOSE BLD-MCNC: 114 MG/DL (ref 70–99)
HCT VFR BLD CALC: 43.7 % (ref 36–48)
HEMOGLOBIN: 13.8 G/DL (ref 12–16)
LYMPHOCYTES ABSOLUTE: 0.2 K/UL (ref 1–5.1)
LYMPHOCYTES RELATIVE PERCENT: 3.6 %
MAGNESIUM: 2.1 MG/DL (ref 1.8–2.4)
MCH RBC QN AUTO: 29 PG (ref 26–34)
MCHC RBC AUTO-ENTMCNC: 31.6 G/DL (ref 31–36)
MCV RBC AUTO: 91.8 FL (ref 80–100)
MONOCYTES ABSOLUTE: 0.2 K/UL (ref 0–1.3)
MONOCYTES RELATIVE PERCENT: 4.5 %
NEUTROPHILS ABSOLUTE: 4.9 K/UL (ref 1.7–7.7)
NEUTROPHILS RELATIVE PERCENT: 91.8 %
PDW BLD-RTO: 15.2 % (ref 12.4–15.4)
PLATELET # BLD: 133 K/UL (ref 135–450)
PMV BLD AUTO: 11.1 FL (ref 5–10.5)
POTASSIUM SERPL-SCNC: 4.7 MMOL/L (ref 3.5–5.1)
RBC # BLD: 4.76 M/UL (ref 4–5.2)
SODIUM BLD-SCNC: 142 MMOL/L (ref 136–145)
WBC # BLD: 5.3 K/UL (ref 4–11)

## 2019-08-13 PROCEDURE — 6370000000 HC RX 637 (ALT 250 FOR IP): Performed by: INTERNAL MEDICINE

## 2019-08-13 PROCEDURE — 97530 THERAPEUTIC ACTIVITIES: CPT

## 2019-08-13 PROCEDURE — 94761 N-INVAS EAR/PLS OXIMETRY MLT: CPT

## 2019-08-13 PROCEDURE — 99232 SBSQ HOSP IP/OBS MODERATE 35: CPT | Performed by: INTERNAL MEDICINE

## 2019-08-13 PROCEDURE — 2580000003 HC RX 258: Performed by: FAMILY MEDICINE

## 2019-08-13 PROCEDURE — 1200000000 HC SEMI PRIVATE

## 2019-08-13 PROCEDURE — 92610 EVALUATE SWALLOWING FUNCTION: CPT

## 2019-08-13 PROCEDURE — 97116 GAIT TRAINING THERAPY: CPT

## 2019-08-13 PROCEDURE — 85025 COMPLETE CBC W/AUTO DIFF WBC: CPT

## 2019-08-13 PROCEDURE — 80048 BASIC METABOLIC PNL TOTAL CA: CPT

## 2019-08-13 PROCEDURE — 6360000002 HC RX W HCPCS: Performed by: FAMILY MEDICINE

## 2019-08-13 PROCEDURE — 92526 ORAL FUNCTION THERAPY: CPT

## 2019-08-13 PROCEDURE — 6370000000 HC RX 637 (ALT 250 FOR IP): Performed by: FAMILY MEDICINE

## 2019-08-13 PROCEDURE — 97535 SELF CARE MNGMENT TRAINING: CPT

## 2019-08-13 PROCEDURE — 36415 COLL VENOUS BLD VENIPUNCTURE: CPT

## 2019-08-13 PROCEDURE — 97161 PT EVAL LOW COMPLEX 20 MIN: CPT

## 2019-08-13 PROCEDURE — 94640 AIRWAY INHALATION TREATMENT: CPT

## 2019-08-13 PROCEDURE — 70450 CT HEAD/BRAIN W/O DYE: CPT

## 2019-08-13 PROCEDURE — 97166 OT EVAL MOD COMPLEX 45 MIN: CPT

## 2019-08-13 PROCEDURE — 2500000003 HC RX 250 WO HCPCS: Performed by: INTERNAL MEDICINE

## 2019-08-13 PROCEDURE — 83735 ASSAY OF MAGNESIUM: CPT

## 2019-08-13 RX ORDER — SPIRONOLACTONE 25 MG/1
25 TABLET ORAL DAILY
Status: DISCONTINUED | OUTPATIENT
Start: 2019-08-13 | End: 2019-08-14 | Stop reason: HOSPADM

## 2019-08-13 RX ORDER — DRONABINOL 2.5 MG/1
2.5 CAPSULE ORAL
Status: DISCONTINUED | OUTPATIENT
Start: 2019-08-13 | End: 2019-08-14 | Stop reason: HOSPADM

## 2019-08-13 RX ORDER — HYDRALAZINE HYDROCHLORIDE 20 MG/ML
10 INJECTION INTRAMUSCULAR; INTRAVENOUS EVERY 4 HOURS PRN
Status: DISCONTINUED | OUTPATIENT
Start: 2019-08-13 | End: 2019-08-14 | Stop reason: HOSPADM

## 2019-08-13 RX ADMIN — LEVOTHYROXINE SODIUM 100 MCG: 100 TABLET ORAL at 05:15

## 2019-08-13 RX ADMIN — TAZOBACTAM SODIUM AND PIPERACILLIN SODIUM 3.38 G: 375; 3 INJECTION, SOLUTION INTRAVENOUS at 08:17

## 2019-08-13 RX ADMIN — DRONABINOL 2.5 MG: 2.5 CAPSULE ORAL at 16:21

## 2019-08-13 RX ADMIN — IPRATROPIUM BROMIDE AND ALBUTEROL SULFATE 1 AMPULE: .5; 3 SOLUTION RESPIRATORY (INHALATION) at 08:28

## 2019-08-13 RX ADMIN — ENOXAPARIN SODIUM 40 MG: 40 INJECTION SUBCUTANEOUS at 08:17

## 2019-08-13 RX ADMIN — IPRATROPIUM BROMIDE AND ALBUTEROL SULFATE 1 AMPULE: .5; 3 SOLUTION RESPIRATORY (INHALATION) at 15:07

## 2019-08-13 RX ADMIN — TAZOBACTAM SODIUM AND PIPERACILLIN SODIUM 3.38 G: 375; 3 INJECTION, SOLUTION INTRAVENOUS at 16:17

## 2019-08-13 RX ADMIN — ACETAMINOPHEN 650 MG: 325 TABLET, FILM COATED ORAL at 05:15

## 2019-08-13 RX ADMIN — Medication 10 ML: at 16:17

## 2019-08-13 RX ADMIN — Medication 10 ML: at 22:12

## 2019-08-13 RX ADMIN — IPRATROPIUM BROMIDE AND ALBUTEROL SULFATE 1 AMPULE: .5; 3 SOLUTION RESPIRATORY (INHALATION) at 11:54

## 2019-08-13 RX ADMIN — Medication 10 ML: at 08:18

## 2019-08-13 RX ADMIN — IPRATROPIUM BROMIDE AND ALBUTEROL SULFATE 1 AMPULE: .5; 3 SOLUTION RESPIRATORY (INHALATION) at 20:00

## 2019-08-13 RX ADMIN — ALLOPURINOL 100 MG: 100 TABLET ORAL at 08:17

## 2019-08-13 ASSESSMENT — PAIN DESCRIPTION - PAIN TYPE: TYPE: ACUTE PAIN

## 2019-08-13 ASSESSMENT — PAIN SCALES - GENERAL
PAINLEVEL_OUTOF10: 3
PAINLEVEL_OUTOF10: 0
PAINLEVEL_OUTOF10: 3

## 2019-08-13 ASSESSMENT — PAIN DESCRIPTION - LOCATION: LOCATION: HEAD

## 2019-08-14 VITALS
TEMPERATURE: 98.4 F | RESPIRATION RATE: 18 BRPM | BODY MASS INDEX: 27.8 KG/M2 | SYSTOLIC BLOOD PRESSURE: 134 MMHG | HEART RATE: 96 BPM | WEIGHT: 183.4 LBS | HEIGHT: 68 IN | DIASTOLIC BLOOD PRESSURE: 83 MMHG | OXYGEN SATURATION: 96 %

## 2019-08-14 DIAGNOSIS — I31.39 PERICARDIAL EFFUSION: Primary | ICD-10-CM

## 2019-08-14 PROBLEM — C34.90 NON-SMALL CELL LUNG CANCER (NSCLC) (HCC): Status: ACTIVE | Noted: 2019-08-14

## 2019-08-14 LAB
ANION GAP SERPL CALCULATED.3IONS-SCNC: 9 MMOL/L (ref 3–16)
BASOPHILS ABSOLUTE: 0 K/UL (ref 0–0.2)
BASOPHILS RELATIVE PERCENT: 0.1 %
BUN BLDV-MCNC: 36 MG/DL (ref 7–20)
CALCIUM SERPL-MCNC: 9.7 MG/DL (ref 8.3–10.6)
CHLORIDE BLD-SCNC: 110 MMOL/L (ref 99–110)
CO2: 21 MMOL/L (ref 21–32)
CREAT SERPL-MCNC: 1.7 MG/DL (ref 0.6–1.2)
EOSINOPHILS ABSOLUTE: 0.1 K/UL (ref 0–0.6)
EOSINOPHILS RELATIVE PERCENT: 1.5 %
GFR AFRICAN AMERICAN: 35
GFR NON-AFRICAN AMERICAN: 29
GLUCOSE BLD-MCNC: 105 MG/DL (ref 70–99)
HCT VFR BLD CALC: 44.6 % (ref 36–48)
HEMOGLOBIN: 13.9 G/DL (ref 12–16)
LYMPHOCYTES ABSOLUTE: 0.5 K/UL (ref 1–5.1)
LYMPHOCYTES RELATIVE PERCENT: 6.1 %
MAGNESIUM: 2.2 MG/DL (ref 1.8–2.4)
MCH RBC QN AUTO: 29.3 PG (ref 26–34)
MCHC RBC AUTO-ENTMCNC: 31.1 G/DL (ref 31–36)
MCV RBC AUTO: 94 FL (ref 80–100)
MONOCYTES ABSOLUTE: 0.6 K/UL (ref 0–1.3)
MONOCYTES RELATIVE PERCENT: 8.5 %
NEUTROPHILS ABSOLUTE: 6.3 K/UL (ref 1.7–7.7)
NEUTROPHILS RELATIVE PERCENT: 83.8 %
PDW BLD-RTO: 15.9 % (ref 12.4–15.4)
PLATELET # BLD: 131 K/UL (ref 135–450)
PMV BLD AUTO: 10 FL (ref 5–10.5)
POTASSIUM SERPL-SCNC: 4.2 MMOL/L (ref 3.5–5.1)
RBC # BLD: 4.75 M/UL (ref 4–5.2)
SODIUM BLD-SCNC: 140 MMOL/L (ref 136–145)
WBC # BLD: 7.5 K/UL (ref 4–11)

## 2019-08-14 PROCEDURE — 97530 THERAPEUTIC ACTIVITIES: CPT

## 2019-08-14 PROCEDURE — 2580000003 HC RX 258: Performed by: FAMILY MEDICINE

## 2019-08-14 PROCEDURE — 94640 AIRWAY INHALATION TREATMENT: CPT

## 2019-08-14 PROCEDURE — 6360000002 HC RX W HCPCS: Performed by: FAMILY MEDICINE

## 2019-08-14 PROCEDURE — 2500000003 HC RX 250 WO HCPCS: Performed by: INTERNAL MEDICINE

## 2019-08-14 PROCEDURE — 36415 COLL VENOUS BLD VENIPUNCTURE: CPT

## 2019-08-14 PROCEDURE — 80048 BASIC METABOLIC PNL TOTAL CA: CPT

## 2019-08-14 PROCEDURE — 99232 SBSQ HOSP IP/OBS MODERATE 35: CPT | Performed by: INTERNAL MEDICINE

## 2019-08-14 PROCEDURE — 2580000003 HC RX 258

## 2019-08-14 PROCEDURE — 94761 N-INVAS EAR/PLS OXIMETRY MLT: CPT

## 2019-08-14 PROCEDURE — 6370000000 HC RX 637 (ALT 250 FOR IP): Performed by: INTERNAL MEDICINE

## 2019-08-14 PROCEDURE — 6370000000 HC RX 637 (ALT 250 FOR IP): Performed by: FAMILY MEDICINE

## 2019-08-14 PROCEDURE — 92526 ORAL FUNCTION THERAPY: CPT

## 2019-08-14 PROCEDURE — 83735 ASSAY OF MAGNESIUM: CPT

## 2019-08-14 PROCEDURE — 97535 SELF CARE MNGMENT TRAINING: CPT

## 2019-08-14 PROCEDURE — 85025 COMPLETE CBC W/AUTO DIFF WBC: CPT

## 2019-08-14 RX ORDER — SODIUM CHLORIDE 9 MG/ML
INJECTION, SOLUTION INTRAVENOUS
Status: COMPLETED
Start: 2019-08-14 | End: 2019-08-14

## 2019-08-14 RX ORDER — DRONABINOL 2.5 MG/1
2.5 CAPSULE ORAL
Qty: 60 CAPSULE | Refills: 0 | Status: CANCELLED | OUTPATIENT
Start: 2019-08-14 | End: 2019-09-13

## 2019-08-14 RX ORDER — DRONABINOL 2.5 MG/1
2.5 CAPSULE ORAL
Qty: 60 CAPSULE | Refills: 0 | Status: SHIPPED | OUTPATIENT
Start: 2019-08-14 | End: 2019-09-13

## 2019-08-14 RX ORDER — ONDANSETRON 4 MG/1
4 TABLET, ORALLY DISINTEGRATING ORAL 3 TIMES DAILY PRN
Qty: 21 TABLET | Refills: 0 | Status: SHIPPED | OUTPATIENT
Start: 2019-08-14

## 2019-08-14 RX ORDER — AMOXICILLIN AND CLAVULANATE POTASSIUM 250; 125 MG/1; MG/1
1 TABLET, FILM COATED ORAL 2 TIMES DAILY
Qty: 10 TABLET | Refills: 0 | Status: SHIPPED | OUTPATIENT
Start: 2019-08-14 | End: 2019-08-19

## 2019-08-14 RX ADMIN — ONDANSETRON 4 MG: 2 INJECTION INTRAMUSCULAR; INTRAVENOUS at 06:45

## 2019-08-14 RX ADMIN — SODIUM CHLORIDE 100 ML: 9 INJECTION, SOLUTION INTRAVENOUS at 08:45

## 2019-08-14 RX ADMIN — TAZOBACTAM SODIUM AND PIPERACILLIN SODIUM 3.38 G: 375; 3 INJECTION, SOLUTION INTRAVENOUS at 00:00

## 2019-08-14 RX ADMIN — TAZOBACTAM SODIUM AND PIPERACILLIN SODIUM 3.38 G: 375; 3 INJECTION, SOLUTION INTRAVENOUS at 08:30

## 2019-08-14 RX ADMIN — ALLOPURINOL 100 MG: 100 TABLET ORAL at 08:29

## 2019-08-14 RX ADMIN — Medication 10 ML: at 08:30

## 2019-08-14 RX ADMIN — DRONABINOL 2.5 MG: 2.5 CAPSULE ORAL at 11:50

## 2019-08-14 RX ADMIN — LEVOTHYROXINE SODIUM 100 MCG: 100 TABLET ORAL at 06:45

## 2019-08-14 RX ADMIN — IPRATROPIUM BROMIDE AND ALBUTEROL SULFATE 1 AMPULE: .5; 3 SOLUTION RESPIRATORY (INHALATION) at 15:48

## 2019-08-14 RX ADMIN — DRONABINOL 2.5 MG: 2.5 CAPSULE ORAL at 16:56

## 2019-08-14 RX ADMIN — IPRATROPIUM BROMIDE AND ALBUTEROL SULFATE 1 AMPULE: .5; 3 SOLUTION RESPIRATORY (INHALATION) at 11:25

## 2019-08-14 RX ADMIN — ONDANSETRON 4 MG: 2 INJECTION INTRAMUSCULAR; INTRAVENOUS at 14:14

## 2019-08-14 RX ADMIN — SPIRONOLACTONE 25 MG: 25 TABLET ORAL at 08:29

## 2019-08-14 RX ADMIN — ENOXAPARIN SODIUM 40 MG: 40 INJECTION SUBCUTANEOUS at 08:27

## 2019-08-14 RX ADMIN — IPRATROPIUM BROMIDE AND ALBUTEROL SULFATE 1 AMPULE: .5; 3 SOLUTION RESPIRATORY (INHALATION) at 07:34

## 2019-08-14 ASSESSMENT — PAIN SCALES - GENERAL: PAINLEVEL_OUTOF10: 0

## 2019-08-15 ENCOUNTER — CARE COORDINATION (OUTPATIENT)
Dept: CASE MANAGEMENT | Age: 84
End: 2019-08-15

## 2019-08-15 ENCOUNTER — TELEPHONE (OUTPATIENT)
Dept: FAMILY MEDICINE CLINIC | Age: 84
End: 2019-08-15

## 2019-08-15 DIAGNOSIS — R06.02 SOB (SHORTNESS OF BREATH): Primary | ICD-10-CM

## 2019-08-15 PROCEDURE — 1111F DSCHRG MED/CURRENT MED MERGE: CPT | Performed by: FAMILY MEDICINE

## 2019-08-15 NOTE — CARE COORDINATION
[facility] Transitions Initial Follow Up Call    Call within 2 business days of discharge: Yes    Patient: Patrice Xavier Patient : 1935   MRN: [id number]  Reason for Admission:   Discharge Date: 19 RARS: Readmission Risk Score: 18      Last Discharge [facility]       Complaint Diagnosis Description Type Department Provider    19 Shortness of Breath Dyspnea and respiratory abnormalities . .. ED to Hosp-Admission (Discharged) (ADMITTED) SANDRA 5T Latonya Parker MD; Michelle Harrington. .. Spoke with: [facility]: [facility]    Non-face-to-face services provided:  Obtained and reviewed discharge summary and/or continuity of care documents    Care Transitions 24 Hour Call    Do you have any ongoing symptoms?:  No  Do you have a copy of your discharge instructions?:  Yes  Do you have all of your prescriptions and are they filled?:  Yes  Have you been contacted by a [address]?:  No  Have you scheduled your follow up appointment?:  No  Were you discharged with any Home Care or Post Acute Services:  Yes  Post Acute Services:  Home Health (Comment: [facility])  Patient DME:  Straight cane, Other  Other Patient DME:  rollator  Do you have support at home?:  Alone  Do you feel like you have everything you need to keep you well at home?:  Yes  Are you an active caregiver in your home?:  No  Care Transitions Interventions  No Identified Needs       Pt states doing well, no issues or concerns, just a little tired and weak from hospital stay. Denies CP, SOB, cough, fever. Has all new meds, reviewed all others. HC will be out tomorrow. F/U appts listed below, encouraged pt to call PCP office for a hospital f/u appt, voiced understanding.  Agreed to more CTC f/u calls      Follow Up  Future Appointments   Date Time Provider Rochelle Yu   2019  1:45 PM Joshua Curry MD AFL [facility] AFL [facility]O   2019 10[facility]   2019 11:15 AM Geri Antonio

## 2019-08-16 ENCOUNTER — TELEPHONE (OUTPATIENT)
Dept: FAMILY MEDICINE CLINIC | Age: 84
End: 2019-08-16

## 2019-08-20 ENCOUNTER — OFFICE VISIT (OUTPATIENT)
Dept: FAMILY MEDICINE CLINIC | Age: 84
End: 2019-08-20
Payer: MEDICARE

## 2019-08-20 VITALS
WEIGHT: 185.4 LBS | OXYGEN SATURATION: 95 % | DIASTOLIC BLOOD PRESSURE: 88 MMHG | SYSTOLIC BLOOD PRESSURE: 122 MMHG | HEART RATE: 107 BPM | BODY MASS INDEX: 28.19 KG/M2

## 2019-08-20 DIAGNOSIS — I31.39 PERICARDIAL EFFUSION: ICD-10-CM

## 2019-08-20 DIAGNOSIS — E79.0 HYPERURICEMIA: ICD-10-CM

## 2019-08-20 DIAGNOSIS — C34.91 NON-SMALL CELL CANCER OF RIGHT LUNG (HCC): ICD-10-CM

## 2019-08-20 DIAGNOSIS — J18.9 PNEUMONIA OF RIGHT LUNG DUE TO INFECTIOUS ORGANISM, UNSPECIFIED PART OF LUNG: ICD-10-CM

## 2019-08-20 DIAGNOSIS — N18.4 STAGE 4 CHRONIC KIDNEY DISEASE (HCC): ICD-10-CM

## 2019-08-20 DIAGNOSIS — E44.0 MODERATE MALNUTRITION (HCC): ICD-10-CM

## 2019-08-20 DIAGNOSIS — J98.11 ATELECTASIS: ICD-10-CM

## 2019-08-20 DIAGNOSIS — J90 PLEURAL EFFUSION: ICD-10-CM

## 2019-08-20 DIAGNOSIS — Z09 HOSPITAL DISCHARGE FOLLOW-UP: Primary | ICD-10-CM

## 2019-08-20 PROCEDURE — 1111F DSCHRG MED/CURRENT MED MERGE: CPT | Performed by: FAMILY MEDICINE

## 2019-08-20 PROCEDURE — 99496 TRANSJ CARE MGMT HIGH F2F 7D: CPT | Performed by: FAMILY MEDICINE

## 2019-08-20 RX ORDER — ALLOPURINOL 100 MG/1
100 TABLET ORAL DAILY
Qty: 30 TABLET | Refills: 5 | Status: SHIPPED | OUTPATIENT
Start: 2019-08-20 | End: 2019-08-28 | Stop reason: SDUPTHER

## 2019-08-21 ENCOUNTER — HOSPITAL ENCOUNTER (OUTPATIENT)
Dept: PET IMAGING | Age: 84
Discharge: HOME | End: 2019-08-21
Payer: MEDICARE

## 2019-08-21 VITALS — WEIGHT: 184 LBS | HEIGHT: 68 IN | BODY MASS INDEX: 27.89 KG/M2

## 2019-08-21 DIAGNOSIS — C34.12 CANCER OF BRONCHUS OF LEFT UPPER LOBE (HCC): ICD-10-CM

## 2019-08-21 PROCEDURE — 78815 PET IMAGE W/CT SKULL-THIGH: CPT

## 2019-08-21 PROCEDURE — 3430000000 HC RX DIAGNOSTIC RADIOPHARMACEUTICAL: Performed by: INTERNAL MEDICINE

## 2019-08-21 PROCEDURE — A9552 F18 FDG: HCPCS | Performed by: INTERNAL MEDICINE

## 2019-08-21 RX ORDER — FLUDEOXYGLUCOSE F 18 200 MCI/ML
14.5 INJECTION, SOLUTION INTRAVENOUS
Status: COMPLETED | OUTPATIENT
Start: 2019-08-21 | End: 2019-08-21

## 2019-08-21 RX ADMIN — FLUDEOXYGLUCOSE F 18 14.5 MILLICURIE: 200 INJECTION, SOLUTION INTRAVENOUS at 15:59

## 2019-08-22 ENCOUNTER — CARE COORDINATION (OUTPATIENT)
Dept: CASE MANAGEMENT | Age: 84
End: 2019-08-22

## 2019-08-22 NOTE — CARE COORDINATION
[facility] Transitions Follow Up Call    2019    Patient: Mikey Kincaid  Patient : 1935   MRN: [id number]  Reason for Admission:     Discharge Date: 19 RARS: Readmission Risk Score: 18       Spoke with: Justen Nick Lizz Transitions Subsequent and Final Call    Subsequent and Final Calls  Do you have any ongoing symptoms?:  No  Have your medications changed?:  No  Do you have any questions related to your medications?:  No  Do you currently have any active services?:  Yes  Are you currently active with any services?:  Home Health  Do you have any needs or concerns that I can assist you with?:  No  Identified Barriers:  None  Care Transitions Interventions  No Identified Needs  Other Interventions:          Pt states doing well, no issues or concerns. Continues with therapy. Had a good f/u with PCP.   Agreed to more CTC f/u calls      Follow Up  Future Appointments   Date Time Provider Rochelle Yu   2019  1:45 PM Joshua Rizzo MD AFL NASO AFL NASO   2019 10:30 AM ECHO ROOM 1 [facility]   2019 11:15 AM Tamika Benitez APRN - CNP FF Cardio MMA   2019  2:45 PM Corey Blankenship MD PULM & CC MMA   10/8/2019  1:00 PM Jovan Real MD FF ENT [facility]       Erica Arguelles RN

## 2019-08-28 ENCOUNTER — PRE-PROCEDURE TELEPHONE (OUTPATIENT)
Dept: INTERVENTIONAL RADIOLOGY/VASCULAR | Age: 84
End: 2019-08-28

## 2019-08-29 ENCOUNTER — TELEPHONE (OUTPATIENT)
Dept: FAMILY MEDICINE CLINIC | Age: 84
End: 2019-08-29

## 2019-08-30 ENCOUNTER — HOSPITAL ENCOUNTER (OUTPATIENT)
Dept: GENERAL RADIOLOGY | Age: 84
Discharge: HOME | End: 2019-08-30
Payer: MEDICARE

## 2019-08-30 ENCOUNTER — HOSPITAL ENCOUNTER (OUTPATIENT)
Dept: ULTRASOUND IMAGING | Age: 84
Discharge: HOME | End: 2019-08-30
Payer: MEDICARE

## 2019-08-30 VITALS
DIASTOLIC BLOOD PRESSURE: 78 MMHG | OXYGEN SATURATION: 98 % | HEART RATE: 108 BPM | TEMPERATURE: 97.2 F | RESPIRATION RATE: 20 BRPM | SYSTOLIC BLOOD PRESSURE: 159 MMHG

## 2019-08-30 DIAGNOSIS — J90 PLEURAL EFFUSION ON RIGHT: ICD-10-CM

## 2019-08-30 DIAGNOSIS — J91.0 MALIGNANT PLEURAL EFFUSION: ICD-10-CM

## 2019-08-30 DIAGNOSIS — C34.90 NON-SMALL CELL LUNG CANCER, UNSPECIFIED LATERALITY (HCC): ICD-10-CM

## 2019-08-30 LAB
APTT: 29.8 SEC (ref 26–36)
INR BLD: 1 (ref 0.86–1.14)
PROTHROMBIN TIME: 11.4 SEC (ref 9.8–13)
REASON FOR REJECTION: NORMAL
REJECTED TEST: NORMAL

## 2019-08-30 PROCEDURE — 7100000011 HC PHASE II RECOVERY - ADDTL 15 MIN

## 2019-08-30 PROCEDURE — 85610 PROTHROMBIN TIME: CPT

## 2019-08-30 PROCEDURE — 85730 THROMBOPLASTIN TIME PARTIAL: CPT

## 2019-08-30 PROCEDURE — 71045 X-RAY EXAM CHEST 1 VIEW: CPT

## 2019-08-30 PROCEDURE — 6370000000 HC RX 637 (ALT 250 FOR IP): Performed by: INTERNAL MEDICINE

## 2019-08-30 PROCEDURE — 88112 CYTOPATH CELL ENHANCE TECH: CPT

## 2019-08-30 PROCEDURE — 7100000010 HC PHASE II RECOVERY - FIRST 15 MIN

## 2019-08-30 PROCEDURE — 88305 TISSUE EXAM BY PATHOLOGIST: CPT

## 2019-08-30 PROCEDURE — 36415 COLL VENOUS BLD VENIPUNCTURE: CPT

## 2019-08-30 PROCEDURE — 2500000003 HC RX 250 WO HCPCS: Performed by: INTERNAL MEDICINE

## 2019-08-30 PROCEDURE — 32555 ASPIRATE PLEURA W/ IMAGING: CPT

## 2019-08-30 RX ORDER — LIDOCAINE HYDROCHLORIDE 10 MG/ML
5 INJECTION, SOLUTION EPIDURAL; INFILTRATION; INTRACAUDAL; PERINEURAL ONCE
Status: COMPLETED | OUTPATIENT
Start: 2019-08-30 | End: 2019-08-30

## 2019-08-30 RX ADMIN — NEOMYCIN AND POLYMYXIN B SULFATES, BACITRACIN ZINC, AND HYDROCORTISONE: 3.5; 5000; 400; 1 OINTMENT TOPICAL at 11:15

## 2019-08-30 RX ADMIN — LIDOCAINE HYDROCHLORIDE 5 ML: 10 INJECTION, SOLUTION EPIDURAL; INFILTRATION; INTRACAUDAL; PERINEURAL at 10:55

## 2019-09-03 LAB
HCT VFR BLD CALC: 46.7 % (ref 36–48)
HEMOGLOBIN: 15.3 G/DL (ref 12–16)
MCH RBC QN AUTO: 29.5 PG (ref 26–34)
MCHC RBC AUTO-ENTMCNC: 32.8 G/DL (ref 31–36)
MCV RBC AUTO: 90.2 FL (ref 80–100)
PDW BLD-RTO: 15.6 % (ref 12.4–15.4)
PLATELET # BLD: 188 K/UL (ref 135–450)
PMV BLD AUTO: 11.4 FL (ref 5–10.5)
RBC # BLD: 5.18 M/UL (ref 4–5.2)
WBC # BLD: 9.1 K/UL (ref 4–11)

## 2019-09-04 PROBLEM — N18.30 CKD (CHRONIC KIDNEY DISEASE), STAGE III (HCC): Status: ACTIVE | Noted: 2019-09-04

## 2019-09-04 PROBLEM — N17.9 AKI (ACUTE KIDNEY INJURY) (HCC): Status: ACTIVE | Noted: 2019-09-04

## 2019-09-04 LAB
ALBUMIN SERPL-MCNC: 3.8 G/DL (ref 3.4–5)
ANION GAP SERPL CALCULATED.3IONS-SCNC: 16 MMOL/L (ref 3–16)
BUN BLDV-MCNC: 23 MG/DL (ref 7–20)
CALCIUM SERPL-MCNC: 10.8 MG/DL (ref 8.3–10.6)
CHLORIDE BLD-SCNC: 100 MMOL/L (ref 99–110)
CO2: 22 MMOL/L (ref 21–32)
CREAT SERPL-MCNC: 1.3 MG/DL (ref 0.6–1.2)
GFR AFRICAN AMERICAN: 47
GFR NON-AFRICAN AMERICAN: 39
GLUCOSE BLD-MCNC: 107 MG/DL (ref 70–99)
PARATHYROID HORMONE INTACT: 102.5 PG/ML (ref 14–72)
PHOSPHORUS: 2.6 MG/DL (ref 2.5–4.9)
POTASSIUM SERPL-SCNC: 4.4 MMOL/L (ref 3.5–5.1)
SODIUM BLD-SCNC: 138 MMOL/L (ref 136–145)

## 2019-09-05 ENCOUNTER — OFFICE VISIT (OUTPATIENT)
Dept: CARDIOLOGY CLINIC | Age: 84
End: 2019-09-05
Payer: MEDICARE

## 2019-09-05 ENCOUNTER — CARE COORDINATION (OUTPATIENT)
Dept: CASE MANAGEMENT | Age: 84
End: 2019-09-05

## 2019-09-05 ENCOUNTER — HOSPITAL ENCOUNTER (OUTPATIENT)
Dept: NON INVASIVE DIAGNOSTICS | Age: 84
Discharge: HOME | End: 2019-09-05
Payer: MEDICARE

## 2019-09-05 VITALS
SYSTOLIC BLOOD PRESSURE: 120 MMHG | OXYGEN SATURATION: 94 % | HEIGHT: 68 IN | WEIGHT: 176 LBS | DIASTOLIC BLOOD PRESSURE: 82 MMHG | BODY MASS INDEX: 26.67 KG/M2 | HEART RATE: 102 BPM

## 2019-09-05 DIAGNOSIS — R06.02 SOB (SHORTNESS OF BREATH): ICD-10-CM

## 2019-09-05 DIAGNOSIS — I10 ESSENTIAL HYPERTENSION: ICD-10-CM

## 2019-09-05 DIAGNOSIS — I31.39 PERICARDIAL EFFUSION: Primary | ICD-10-CM

## 2019-09-05 DIAGNOSIS — I31.39 PERICARDIAL EFFUSION: ICD-10-CM

## 2019-09-05 LAB
LEFT VENTRICULAR EJECTION FRACTION HIGH VALUE: 60 %
LEFT VENTRICULAR EJECTION FRACTION MODE: NORMAL
LV EF: 55 %

## 2019-09-05 PROCEDURE — G8427 DOCREV CUR MEDS BY ELIG CLIN: HCPCS | Performed by: NURSE PRACTITIONER

## 2019-09-05 PROCEDURE — 1123F ACP DISCUSS/DSCN MKR DOCD: CPT | Performed by: NURSE PRACTITIONER

## 2019-09-05 PROCEDURE — 99214 OFFICE O/P EST MOD 30 MIN: CPT | Performed by: NURSE PRACTITIONER

## 2019-09-05 PROCEDURE — 1111F DSCHRG MED/CURRENT MED MERGE: CPT | Performed by: NURSE PRACTITIONER

## 2019-09-05 PROCEDURE — G8417 CALC BMI ABV UP PARAM F/U: HCPCS | Performed by: NURSE PRACTITIONER

## 2019-09-05 PROCEDURE — 4040F PNEUMOC VAC/ADMIN/RCVD: CPT | Performed by: NURSE PRACTITIONER

## 2019-09-05 PROCEDURE — 1036F TOBACCO NON-USER: CPT | Performed by: NURSE PRACTITIONER

## 2019-09-05 PROCEDURE — 1090F PRES/ABSN URINE INCON ASSESS: CPT | Performed by: NURSE PRACTITIONER

## 2019-09-05 PROCEDURE — G8400 PT W/DXA NO RESULTS DOC: HCPCS | Performed by: NURSE PRACTITIONER

## 2019-09-05 PROCEDURE — 93308 TTE F-UP OR LMTD: CPT

## 2019-09-05 NOTE — PROGRESS NOTES
to date. Past Medical History:   Diagnosis Date    Nieves's esophagus     Cholelithiasis 10/2013    CT scan    CKD (chronic kidney disease) stage 4, GFR 15-29 ml/min ([facility])     Dr. John Martinez COPD (chronic obstructive pulmonary disease) ([facility]     Endometriosis     Hypertension     Hypothyroidism 10/27/2015    Non-small cell lung cancer (NSCLC) ([facility] 2019    Osteoporosis     Overactive bladder     Dr. Cristopher Hankins Right bundle branch block     Thyroid disease     Urethral stricture      Social History:    Social History     Tobacco Use   Smoking Status Former Smoker    Packs/day: 1.00    Years: 45.00    Pack years: 45.00    Types: Cigarettes    Last attempt to quit: 2006    Years since quittin.9   Smokeless Tobacco Never Used     Current Medications:  Current Outpatient Medications   Medication Sig Dispense Refill    spironolactone (ALDACTONE) 25 MG tablet TAKE 1 TABLET BY MOUTH EVERY DAY 90 tablet 1    allopurinol (ZYLOPRIM) 100 MG tablet TAKE 2 TABLETS BY MOUTH EVERY  tablet 1    dronabinol (MARINOL) 2.5 MG capsule Take 1 capsule by mouth 2 times daily (before meals) for 30 days. 60 capsule 0    ondansetron (ZOFRAN-ODT) 4 MG disintegrating tablet Take 1 tablet by mouth 3 times daily as needed for Nausea or Vomiting 21 tablet 0    fluticasone-vilanterol (BREO ELLIPTA) 100-25 MCG/INH AEPB inhaler Inhale 1 puff into the lungs daily 30 each 5    Solifenacin Succinate (VESICARE PO) Take 1 tablet by mouth every morning      levothyroxine (SYNTHROID) 100 MCG tablet TAKE 1 TABLET BY MOUTH EVERY DAY 90 tablet 3    Calcium Carbonate (CALCIUM 600 PO) Take by mouth      Mirabegron ER (MYRBETRIQ) 25 MG TB24 Take 1 tablet by mouth daily      acetaminophen (TYLENOL) 325 MG tablet Take 650 mg by mouth every 6 hours as needed.  esomeprazole (NEXIUM) 40 MG capsule Take 40 mg by mouth every morning (before breakfast).         Multiple Vitamins-Minerals (MULTIVITAMIN,TX-MINERALS)

## 2019-09-06 ENCOUNTER — HOSPITAL ENCOUNTER (OUTPATIENT)
Dept: GENERAL RADIOLOGY | Age: 84
Discharge: HOME | End: 2019-09-06
Payer: MEDICARE

## 2019-09-06 ENCOUNTER — OFFICE VISIT (OUTPATIENT)
Dept: PULMONOLOGY | Age: 84
End: 2019-09-06
Payer: MEDICARE

## 2019-09-06 ENCOUNTER — HOSPITAL ENCOUNTER (OUTPATIENT)
Age: 84
Discharge: HOME | End: 2019-09-06
Payer: MEDICARE

## 2019-09-06 VITALS
WEIGHT: 176 LBS | SYSTOLIC BLOOD PRESSURE: 132 MMHG | HEART RATE: 14 BPM | RESPIRATION RATE: 14 BRPM | DIASTOLIC BLOOD PRESSURE: 90 MMHG | BODY MASS INDEX: 26.76 KG/M2 | OXYGEN SATURATION: 94 %

## 2019-09-06 DIAGNOSIS — Z87.09 H/O PLEURAL EFFUSION: Primary | ICD-10-CM

## 2019-09-06 DIAGNOSIS — C34.90 NON-SMALL CELL LUNG CANCER, UNSPECIFIED LATERALITY (HCC): ICD-10-CM

## 2019-09-06 DIAGNOSIS — Z87.09 H/O PLEURAL EFFUSION: ICD-10-CM

## 2019-09-06 DIAGNOSIS — J44.9 COPD, SEVERITY TO BE DETERMINED (HCC): ICD-10-CM

## 2019-09-06 PROCEDURE — 1123F ACP DISCUSS/DSCN MKR DOCD: CPT | Performed by: INTERNAL MEDICINE

## 2019-09-06 PROCEDURE — 3023F SPIROM DOC REV: CPT | Performed by: INTERNAL MEDICINE

## 2019-09-06 PROCEDURE — G8417 CALC BMI ABV UP PARAM F/U: HCPCS | Performed by: INTERNAL MEDICINE

## 2019-09-06 PROCEDURE — G8400 PT W/DXA NO RESULTS DOC: HCPCS | Performed by: INTERNAL MEDICINE

## 2019-09-06 PROCEDURE — 71046 X-RAY EXAM CHEST 2 VIEWS: CPT

## 2019-09-06 PROCEDURE — 1090F PRES/ABSN URINE INCON ASSESS: CPT | Performed by: INTERNAL MEDICINE

## 2019-09-06 PROCEDURE — 99214 OFFICE O/P EST MOD 30 MIN: CPT | Performed by: INTERNAL MEDICINE

## 2019-09-06 PROCEDURE — G8926 SPIRO NO PERF OR DOC: HCPCS | Performed by: INTERNAL MEDICINE

## 2019-09-06 PROCEDURE — 1036F TOBACCO NON-USER: CPT | Performed by: INTERNAL MEDICINE

## 2019-09-06 PROCEDURE — G8427 DOCREV CUR MEDS BY ELIG CLIN: HCPCS | Performed by: INTERNAL MEDICINE

## 2019-09-06 PROCEDURE — 1111F DSCHRG MED/CURRENT MED MERGE: CPT | Performed by: INTERNAL MEDICINE

## 2019-09-06 PROCEDURE — 4040F PNEUMOC VAC/ADMIN/RCVD: CPT | Performed by: INTERNAL MEDICINE

## 2019-09-06 RX ORDER — PREDNISONE 10 MG/1
TABLET ORAL
Qty: 30 TABLET | Refills: 0 | Status: SHIPPED | OUTPATIENT
Start: 2019-09-06 | End: 2019-09-16 | Stop reason: ALTCHOICE

## 2019-09-06 RX ORDER — IPRATROPIUM BROMIDE AND ALBUTEROL SULFATE 2.5; .5 MG/3ML; MG/3ML
1 SOLUTION RESPIRATORY (INHALATION) EVERY 4 HOURS
Qty: 360 ML | Refills: 3 | Status: SHIPPED | OUTPATIENT
Start: 2019-09-06

## 2019-09-06 RX ORDER — IPRATROPIUM BROMIDE AND ALBUTEROL SULFATE 2.5; .5 MG/3ML; MG/3ML
1 SOLUTION RESPIRATORY (INHALATION) EVERY 4 HOURS
Qty: 360 ML | Refills: 3 | Status: SHIPPED | OUTPATIENT
Start: 2019-09-06 | End: 2019-09-06 | Stop reason: SDUPTHER

## 2019-09-06 ASSESSMENT — ENCOUNTER SYMPTOMS
ABDOMINAL PAIN: 0
DIARRHEA: 0
RHINORRHEA: 0
COUGH: 0
WHEEZING: 1
ANAL BLEEDING: 0
BLOOD IN STOOL: 0
CONSTIPATION: 0
CHOKING: 0
BACK PAIN: 0
STRIDOR: 0
CHEST TIGHTNESS: 1
SINUS PRESSURE: 0
APNEA: 0
SHORTNESS OF BREATH: 1
SORE THROAT: 0
ABDOMINAL DISTENTION: 0
VOICE CHANGE: 0

## 2019-09-06 NOTE — PROGRESS NOTES
esophagus     Cholelithiasis 10/2013    CT scan    CKD (chronic kidney disease) stage 4, GFR 15-29 ml/min ([facility])     Dr. Narciso Campbell COPD (chronic obstructive [facility] disease) ([facility]     Endometriosis     Hypertension     Hypothyroidism 10/27/2015    Non-small cell lung cancer (NSCLC) ([facility] 8/14/2019    Osteoporosis     Overactive bladder     Dr. Francisca Berrios Right bundle branch block     Thyroid disease     Urethral stricture      Past Surgical History:   Procedure Laterality Date    APPENDECTOMY      BREAST SURGERY  10/2002    breast lesion    BRONCHOSCOPY N/A 8/12/2019    BRONCHOSCOPY ENDOBRONCHIAL ULTRASOUND performed by Sepideh Burgos MD at [address] [place] [address] N/A 8/12/2019    BRONCHOSCOPY W/EBUS FNA performed by [facility]h [facility]os [facility] at [facility]  prior to 2000    left hand    CATARACT REMOVAL  03, 04/2004    both eyes    COLONOSCOPY  2011    benign polyp    CYSTOSCOPY  2011    ENDOMETRIAL ABLATION  07/1960    ESOPHAGEAL DILATATION  2012    EYE SURGERY      HERNIA REPAIR  11/2003    papaesophageal    HYSTERECTOMY      JOINT REPLACEMENT      KNEE ARTHROSCOPY  prior to 2000    both knees    LAMINECTOMY  10/2/13    PLACEMENT OF LEAD AND SPINAL CORD STIMULATOR    SHOULDER SURGERY  06/2003    rotator cuff repair, partial removal collar bone and shoulder bone    LAZARA AND BSO  11/72    TOTAL KNEE ARTHROPLASTY  05/2006    left    TOTAL KNEE ARTHROPLASTY  03/2007    right knee    UPPER GASTROINTESTINAL ENDOSCOPY  2011    Nieves's esophagus    URETHRAL STRICTURE DILATATION  2009     No family history on file. Review of Systems:  Review of Systems   Constitutional: Positive for appetite change, fatigue and unexpected weight change. Negative for activity change and fever. HENT: Negative for congestion, ear discharge, ear pain, postnasal drip, rhinorrhea, sinus pressure, sneezing, sore throat, tinnitus and voice change.     Respiratory: Positive for

## 2019-09-10 ENCOUNTER — TELEPHONE (OUTPATIENT)
Dept: PULMONOLOGY | Age: 84
End: 2019-09-10

## 2019-09-10 NOTE — TELEPHONE ENCOUNTER
Pt's daughter Juanis Donahue called in asking for an update on the overnight oximetry ordered through [address]. Juanis Donahue stated Pt has not heard anything in regards to this. Juanis Donahue is also asking if we can check to see how this is covered through [employer]. Pt # [phone]    Juanis Donahue also asked about [place] paperwork for herself, let Juanis Donahue know that [facility] De La [facility] typically does not fill out [place] paperwork. Juanis Donahue going to check with  at [facility].

## 2019-09-13 ENCOUNTER — TELEPHONE (OUTPATIENT)
Dept: PULMONOLOGY | Age: 84
End: 2019-09-13

## 2019-09-13 NOTE — TELEPHONE ENCOUNTER
Pt's daughter Bharti Pantoja called in regarding the overnight oximetry that was going to be ordered for Pt. Bharti Pantoja stated they did deliver the nebulizer yesterday, but [address] told her they did not have an order on the overnight oximetry.      Bharti Pantoja # [phone]

## 2019-09-16 ENCOUNTER — APPOINTMENT (OUTPATIENT)
Dept: CT IMAGING | Age: 84
DRG: 175 | End: 2019-09-16
Payer: MEDICARE

## 2019-09-16 ENCOUNTER — HOSPITAL ENCOUNTER (INPATIENT)
Age: 84
LOS: 3 days | Discharge: NURSING, REHAB OR LONG-TERM CARE | DRG: 175 | End: 2019-09-19
Attending: EMERGENCY MEDICINE | Admitting: INTERNAL MEDICINE
Payer: MEDICARE

## 2019-09-16 DIAGNOSIS — C34.90 STAGE 4 MALIGNANT NEOPLASM OF LUNG, UNSPECIFIED LATERALITY (HCC): Primary | ICD-10-CM

## 2019-09-16 DIAGNOSIS — I26.09 ACUTE PULMONARY EMBOLISM WITH ACUTE COR PULMONALE, UNSPECIFIED PULMONARY EMBOLISM TYPE (HCC): ICD-10-CM

## 2019-09-16 DIAGNOSIS — J90 PLEURAL EFFUSION: ICD-10-CM

## 2019-09-16 DIAGNOSIS — I31.39 PERICARDIAL EFFUSION: ICD-10-CM

## 2019-09-16 DIAGNOSIS — I71.20 THORACIC AORTIC ANEURYSM WITHOUT RUPTURE: ICD-10-CM

## 2019-09-16 DIAGNOSIS — R77.8 ELEVATED TROPONIN: ICD-10-CM

## 2019-09-16 PROBLEM — I26.99 PULMONARY EMBOLISM, BILATERAL (HCC): Status: ACTIVE | Noted: 2019-09-16

## 2019-09-16 LAB
A/G RATIO: 1.1 (ref 1.1–2.2)
ALBUMIN SERPL-MCNC: 3.2 G/DL (ref 3.4–5)
ALP BLD-CCNC: 81 U/L (ref 40–129)
ALT SERPL-CCNC: 120 U/L (ref 10–40)
ANION GAP SERPL CALCULATED.3IONS-SCNC: 11 MMOL/L (ref 3–16)
ANISOCYTOSIS: ABNORMAL
APTT: >248 SEC (ref 26–36)
AST SERPL-CCNC: 40 U/L (ref 15–37)
BASOPHILS ABSOLUTE: 0 K/UL (ref 0–0.2)
BASOPHILS RELATIVE PERCENT: 0 %
BILIRUB SERPL-MCNC: 1.1 MG/DL (ref 0–1)
BUN BLDV-MCNC: 51 MG/DL (ref 7–20)
CALCIUM SERPL-MCNC: 10.1 MG/DL (ref 8.3–10.6)
CHLORIDE BLD-SCNC: 99 MMOL/L (ref 99–110)
CO2: 25 MMOL/L (ref 21–32)
CREAT SERPL-MCNC: 1.6 MG/DL (ref 0.6–1.2)
EKG ATRIAL RATE: 153 BPM
EKG DIAGNOSIS: NORMAL
EKG Q-T INTERVAL: 338 MS
EKG QRS DURATION: 116 MS
EKG QTC CALCULATION (BAZETT): 475 MS
EKG R AXIS: -85 DEGREES
EKG T AXIS: -2 DEGREES
EKG VENTRICULAR RATE: 119 BPM
EOSINOPHILS ABSOLUTE: 0 K/UL (ref 0–0.6)
EOSINOPHILS RELATIVE PERCENT: 0 %
GFR AFRICAN AMERICAN: 37
GFR NON-AFRICAN AMERICAN: 31
GLOBULIN: 2.8 G/DL
GLUCOSE BLD-MCNC: 105 MG/DL (ref 70–99)
HCT VFR BLD CALC: 49.9 % (ref 36–48)
HEMOGLOBIN: 15.7 G/DL (ref 12–16)
INR BLD: 0.96 (ref 0.86–1.14)
LACTIC ACID, SEPSIS: 1.2 MMOL/L (ref 0.4–1.9)
LACTIC ACID, SEPSIS: 1.4 MMOL/L (ref 0.4–1.9)
LYMPHOCYTES ABSOLUTE: 0.5 K/UL (ref 1–5.1)
LYMPHOCYTES RELATIVE PERCENT: 4 %
MACROCYTES: ABNORMAL
MCH RBC QN AUTO: 29.2 PG (ref 26–34)
MCHC RBC AUTO-ENTMCNC: 31.5 G/DL (ref 31–36)
MCV RBC AUTO: 92.5 FL (ref 80–100)
MONOCYTES ABSOLUTE: 0.8 K/UL (ref 0–1.3)
MONOCYTES RELATIVE PERCENT: 6 %
NEUTROPHILS ABSOLUTE: 11.4 K/UL (ref 1.7–7.7)
NEUTROPHILS RELATIVE PERCENT: 90 %
PDW BLD-RTO: 16.7 % (ref 12.4–15.4)
PLATELET # BLD: 162 K/UL (ref 135–450)
PLATELET SLIDE REVIEW: ADEQUATE
PMV BLD AUTO: 11.2 FL (ref 5–10.5)
POTASSIUM REFLEX MAGNESIUM: 4.9 MMOL/L (ref 3.5–5.1)
PRO-BNP: ABNORMAL PG/ML (ref 0–449)
PROTHROMBIN TIME: 11 SEC (ref 9.8–13)
RBC # BLD: 5.39 M/UL (ref 4–5.2)
REASON FOR REJECTION: NORMAL
REJECTED TEST: NORMAL
SLIDE REVIEW: ABNORMAL
SODIUM BLD-SCNC: 135 MMOL/L (ref 136–145)
TOTAL PROTEIN: 6 G/DL (ref 6.4–8.2)
TROPONIN: 0.07 NG/ML
TROPONIN: 0.07 NG/ML
WBC # BLD: 12.7 K/UL (ref 4–11)

## 2019-09-16 PROCEDURE — 99285 EMERGENCY DEPT VISIT HI MDM: CPT

## 2019-09-16 PROCEDURE — 6370000000 HC RX 637 (ALT 250 FOR IP): Performed by: INTERNAL MEDICINE

## 2019-09-16 PROCEDURE — 83605 ASSAY OF LACTIC ACID: CPT

## 2019-09-16 PROCEDURE — 83880 ASSAY OF NATRIURETIC PEPTIDE: CPT

## 2019-09-16 PROCEDURE — 2700000000 HC OXYGEN THERAPY PER DAY

## 2019-09-16 PROCEDURE — 85610 PROTHROMBIN TIME: CPT

## 2019-09-16 PROCEDURE — 84443 ASSAY THYROID STIM HORMONE: CPT

## 2019-09-16 PROCEDURE — 71260 CT THORAX DX C+: CPT

## 2019-09-16 PROCEDURE — 87040 BLOOD CULTURE FOR BACTERIA: CPT

## 2019-09-16 PROCEDURE — 1200000000 HC SEMI PRIVATE

## 2019-09-16 PROCEDURE — 94640 AIRWAY INHALATION TREATMENT: CPT

## 2019-09-16 PROCEDURE — 96375 TX/PRO/DX INJ NEW DRUG ADDON: CPT

## 2019-09-16 PROCEDURE — 36415 COLL VENOUS BLD VENIPUNCTURE: CPT

## 2019-09-16 PROCEDURE — 6370000000 HC RX 637 (ALT 250 FOR IP): Performed by: PHYSICIAN ASSISTANT

## 2019-09-16 PROCEDURE — 6360000002 HC RX W HCPCS: Performed by: INTERNAL MEDICINE

## 2019-09-16 PROCEDURE — 94761 N-INVAS EAR/PLS OXIMETRY MLT: CPT

## 2019-09-16 PROCEDURE — 93005 ELECTROCARDIOGRAM TRACING: CPT | Performed by: PHYSICIAN ASSISTANT

## 2019-09-16 PROCEDURE — 84439 ASSAY OF FREE THYROXINE: CPT

## 2019-09-16 PROCEDURE — 6360000004 HC RX CONTRAST MEDICATION: Performed by: PHYSICIAN ASSISTANT

## 2019-09-16 PROCEDURE — 80053 COMPREHEN METABOLIC PANEL: CPT

## 2019-09-16 PROCEDURE — 85730 THROMBOPLASTIN TIME PARTIAL: CPT

## 2019-09-16 PROCEDURE — 85025 COMPLETE CBC W/AUTO DIFF WBC: CPT

## 2019-09-16 PROCEDURE — 93010 ELECTROCARDIOGRAM REPORT: CPT | Performed by: INTERNAL MEDICINE

## 2019-09-16 PROCEDURE — 6360000002 HC RX W HCPCS: Performed by: PHYSICIAN ASSISTANT

## 2019-09-16 PROCEDURE — 84484 ASSAY OF TROPONIN QUANT: CPT

## 2019-09-16 PROCEDURE — 96374 THER/PROPH/DIAG INJ IV PUSH: CPT

## 2019-09-16 RX ORDER — HYDROCODONE BITARTRATE AND ACETAMINOPHEN 5; 325 MG/1; MG/1
1 TABLET ORAL EVERY 6 HOURS PRN
Status: DISCONTINUED | OUTPATIENT
Start: 2019-09-16 | End: 2019-09-19 | Stop reason: HOSPADM

## 2019-09-16 RX ORDER — ESOMEPRAZOLE MAGNESIUM 40 MG/1
40 CAPSULE, DELAYED RELEASE ORAL
Status: DISCONTINUED | OUTPATIENT
Start: 2019-09-17 | End: 2019-09-16 | Stop reason: CLARIF

## 2019-09-16 RX ORDER — SPIRONOLACTONE 25 MG/1
25 TABLET ORAL DAILY
Status: DISCONTINUED | OUTPATIENT
Start: 2019-09-16 | End: 2019-09-19 | Stop reason: HOSPADM

## 2019-09-16 RX ORDER — HEPARIN SODIUM 1000 [USP'U]/ML
80 INJECTION, SOLUTION INTRAVENOUS; SUBCUTANEOUS PRN
Status: DISCONTINUED | OUTPATIENT
Start: 2019-09-16 | End: 2019-09-19 | Stop reason: ALTCHOICE

## 2019-09-16 RX ORDER — HEPARIN SODIUM 1000 [USP'U]/ML
80 INJECTION, SOLUTION INTRAVENOUS; SUBCUTANEOUS ONCE
Status: COMPLETED | OUTPATIENT
Start: 2019-09-16 | End: 2019-09-16

## 2019-09-16 RX ORDER — HEPARIN SODIUM 1000 [USP'U]/ML
40 INJECTION, SOLUTION INTRAVENOUS; SUBCUTANEOUS PRN
Status: DISCONTINUED | OUTPATIENT
Start: 2019-09-16 | End: 2019-09-19 | Stop reason: ALTCHOICE

## 2019-09-16 RX ORDER — HEPARIN SODIUM 10000 [USP'U]/100ML
18 INJECTION, SOLUTION INTRAVENOUS CONTINUOUS
Status: DISCONTINUED | OUTPATIENT
Start: 2019-09-16 | End: 2019-09-17 | Stop reason: SDUPTHER

## 2019-09-16 RX ORDER — IPRATROPIUM BROMIDE AND ALBUTEROL SULFATE 2.5; .5 MG/3ML; MG/3ML
1 SOLUTION RESPIRATORY (INHALATION) EVERY 4 HOURS
Status: DISCONTINUED | OUTPATIENT
Start: 2019-09-16 | End: 2019-09-19 | Stop reason: HOSPADM

## 2019-09-16 RX ORDER — DRONABINOL 2.5 MG/1
5 CAPSULE ORAL DAILY
Status: DISCONTINUED | OUTPATIENT
Start: 2019-09-16 | End: 2019-09-19 | Stop reason: HOSPADM

## 2019-09-16 RX ORDER — SODIUM CHLORIDE 0.9 % (FLUSH) 0.9 %
10 SYRINGE (ML) INJECTION PRN
Status: DISCONTINUED | OUTPATIENT
Start: 2019-09-16 | End: 2019-09-19 | Stop reason: HOSPADM

## 2019-09-16 RX ORDER — LEVOTHYROXINE SODIUM 0.1 MG/1
100 TABLET ORAL DAILY
Status: DISCONTINUED | OUTPATIENT
Start: 2019-09-17 | End: 2019-09-19 | Stop reason: HOSPADM

## 2019-09-16 RX ORDER — DRONABINOL 5 MG/1
CAPSULE ORAL
Refills: 0 | COMMUNITY
Start: 2019-09-11

## 2019-09-16 RX ORDER — SODIUM CHLORIDE 0.9 % (FLUSH) 0.9 %
10 SYRINGE (ML) INJECTION EVERY 12 HOURS SCHEDULED
Status: DISCONTINUED | OUTPATIENT
Start: 2019-09-16 | End: 2019-09-19 | Stop reason: HOSPADM

## 2019-09-16 RX ORDER — ASPIRIN 81 MG/1
81 TABLET, CHEWABLE ORAL DAILY
Status: DISCONTINUED | OUTPATIENT
Start: 2019-09-17 | End: 2019-09-19 | Stop reason: HOSPADM

## 2019-09-16 RX ORDER — ONDANSETRON 2 MG/ML
4 INJECTION INTRAMUSCULAR; INTRAVENOUS ONCE
Status: COMPLETED | OUTPATIENT
Start: 2019-09-16 | End: 2019-09-16

## 2019-09-16 RX ORDER — IPRATROPIUM BROMIDE AND ALBUTEROL SULFATE 2.5; .5 MG/3ML; MG/3ML
1 SOLUTION RESPIRATORY (INHALATION) ONCE
Status: COMPLETED | OUTPATIENT
Start: 2019-09-16 | End: 2019-09-16

## 2019-09-16 RX ORDER — ONDANSETRON 2 MG/ML
4 INJECTION INTRAMUSCULAR; INTRAVENOUS EVERY 6 HOURS PRN
Status: DISCONTINUED | OUTPATIENT
Start: 2019-09-16 | End: 2019-09-19 | Stop reason: HOSPADM

## 2019-09-16 RX ORDER — PANTOPRAZOLE SODIUM 40 MG/1
40 TABLET, DELAYED RELEASE ORAL
Status: DISCONTINUED | OUTPATIENT
Start: 2019-09-17 | End: 2019-09-19 | Stop reason: HOSPADM

## 2019-09-16 RX ORDER — MORPHINE SULFATE 4 MG/ML
4 INJECTION, SOLUTION INTRAMUSCULAR; INTRAVENOUS ONCE
Status: COMPLETED | OUTPATIENT
Start: 2019-09-16 | End: 2019-09-16

## 2019-09-16 RX ADMIN — DRONABINOL 5 MG: 2.5 CAPSULE ORAL at 18:29

## 2019-09-16 RX ADMIN — IPRATROPIUM BROMIDE AND ALBUTEROL SULFATE 1 AMPULE: .5; 3 SOLUTION RESPIRATORY (INHALATION) at 14:17

## 2019-09-16 RX ADMIN — IOPAMIDOL 75 ML: 755 INJECTION, SOLUTION INTRAVENOUS at 15:12

## 2019-09-16 RX ADMIN — HEPARIN SODIUM 6380 UNITS: 1000 INJECTION, SOLUTION INTRAVENOUS; SUBCUTANEOUS at 18:11

## 2019-09-16 RX ADMIN — DILTIAZEM HYDROCHLORIDE 30 MG: 30 TABLET, FILM COATED ORAL at 18:13

## 2019-09-16 RX ADMIN — IPRATROPIUM BROMIDE AND ALBUTEROL SULFATE 3 ML: .5; 3 SOLUTION RESPIRATORY (INHALATION) at 20:43

## 2019-09-16 RX ADMIN — MORPHINE SULFATE 4 MG: 4 INJECTION INTRAVENOUS at 14:16

## 2019-09-16 RX ADMIN — ONDANSETRON 4 MG: 2 INJECTION INTRAMUSCULAR; INTRAVENOUS at 18:46

## 2019-09-16 RX ADMIN — ONDANSETRON 4 MG: 2 INJECTION INTRAMUSCULAR; INTRAVENOUS at 14:16

## 2019-09-16 RX ADMIN — HEPARIN SODIUM 14.4 ML/HR: 10000 INJECTION, SOLUTION INTRAVENOUS at 18:12

## 2019-09-16 RX ADMIN — SPIRONOLACTONE 25 MG: 25 TABLET ORAL at 18:29

## 2019-09-16 ASSESSMENT — ENCOUNTER SYMPTOMS
COUGH: 0
NAUSEA: 0
CONSTIPATION: 0
ABDOMINAL PAIN: 0
COLOR CHANGE: 0
DIARRHEA: 0
SHORTNESS OF BREATH: 1
VOMITING: 0

## 2019-09-16 ASSESSMENT — PAIN DESCRIPTION - PAIN TYPE: TYPE: ACUTE PAIN

## 2019-09-16 ASSESSMENT — PAIN SCALES - GENERAL
PAINLEVEL_OUTOF10: 0
PAINLEVEL_OUTOF10: 0
PAINLEVEL_OUTOF10: 4
PAINLEVEL_OUTOF10: 4
PAINLEVEL_OUTOF10: 0

## 2019-09-16 ASSESSMENT — HEART SCORE
ECG: 0
ECG: 0

## 2019-09-16 ASSESSMENT — PAIN DESCRIPTION - LOCATION: LOCATION: ABDOMEN

## 2019-09-16 NOTE — H&P
(06/2003); hernia repair (11/2003); Cataract removal (03, 04/2004); Total knee arthroplasty (05/2006); Total knee arthroplasty (03/2007); Urethra dilation (2009); Colonoscopy (2011); Upper gastrointestinal endoscopy (2011); Cystoscopy (2011); Esophagus dilation (2012); Appendectomy; Hysterectomy; joint replacement; eye surgery; laminectomy (10/2/13); bronchoscopy (N/A, 8/12/2019); and bronchoscopy (N/A, 8/12/2019). Medications:  No current facility-administered medications on file prior to encounter. Current Outpatient Medications on File Prior to Encounter   Medication Sig Dispense Refill    dronabinol (MARINOL) 5 MG capsule 1 CAPSULE ORALLY 2 TIMES PER DAY. ADMINISTER BEFORE LUNCH AND EVENING MEAL/DINNER.  0    ipratropium-albuterol (DUONEB) 0.5-2.5 (3) MG/3ML SOLN nebulizer solution Inhale 3 mLs into the lungs every 4 hours DX Code J44.9 COPD 360 mL 3    spironolactone (ALDACTONE) 25 MG tablet TAKE 1 TABLET BY MOUTH EVERY DAY 90 tablet 1    ondansetron (ZOFRAN-ODT) 4 MG disintegrating tablet Take 1 tablet by mouth 3 times daily as needed for Nausea or Vomiting 21 tablet 0    fluticasone-vilanterol (BREO ELLIPTA) 100-25 MCG/INH AEPB inhaler Inhale 1 puff into the lungs daily 30 each 5    levothyroxine (SYNTHROID) 100 MCG tablet TAKE 1 TABLET BY MOUTH EVERY DAY 90 tablet 3    Mirabegron ER (MYRBETRIQ) 25 MG TB24 Take 1 tablet by mouth daily      acetaminophen (TYLENOL) 325 MG tablet Take 650 mg by mouth every 6 hours as needed.  esomeprazole (NEXIUM) 40 MG capsule Take 40 mg by mouth every morning (before breakfast). Allergies: Allergies   Allergen Reactions    Cephalexin Other (See Comments)     headaches    Reclast [Zoledronic Acid]     Sulfa Antibiotics         Social History:   reports that she quit smoking about 12 years ago. Her smoking use included cigarettes. She has a 45.00 pack-year smoking history.  She has never used smokeless tobacco. She reports that she drinks

## 2019-09-16 NOTE — ED NOTES
Called RT regarding ordered treatment.      Vinayak Escobar, KALA  09/16/19 3026
Patient returned to room from cat scan by bed, patient is awake, her respirations are easy and unlabored. Patient denies pain at this time. Patient's IV saline locked. patient's daughter is at the bedside.      Wellington Boeck, RN  09/16/19 2827
Patient sleeping following morphine administration, her respirations are easy and unlabored. Patient's daughter is at the bedside.      Kian Valero RN  09/16/19 9063
(ZYLOPRIM) 100 MG tablet TAKE 2 TABLETS BY MOUTH EVERY DAY (Patient taking differently: Pt's family state they are planning to stop taking, she hasn't had gout for years. ) 180 tablet 1    [DISCONTINUED] Solifenacin Succinate (VESICARE PO) Take 1 tablet by mouth every morning      [DISCONTINUED] Calcium Carbonate (CALCIUM 600 PO) Take by mouth      [DISCONTINUED] Multiple Vitamins-Minerals (MULTIVITAMIN,TX-MINERALS) tablet Take 1 tablet by mouth daily.

## 2019-09-16 NOTE — ED PROVIDER NOTES
of Systems   Constitutional: Positive for activity change, appetite change and fatigue. Negative for chills and fever. Respiratory: Positive for shortness of breath. Negative for cough. Cardiovascular: Negative. Negative for chest pain, palpitations and leg swelling. Gastrointestinal: Negative for abdominal pain, constipation, diarrhea, nausea and vomiting. Genitourinary: Negative for decreased urine volume, difficulty urinating, dysuria, flank pain, frequency, hematuria and urgency. Musculoskeletal: Negative for arthralgias, myalgias, neck pain and neck stiffness. Skin: Negative for color change, pallor, rash and wound. Neurological: Positive for weakness. Negative for dizziness, tremors, seizures, syncope, speech difficulty, light-headedness, numbness and headaches. Positives and Pertinent negatives as per HPI. Except as noted abovein the ROS, all other systems were reviewed and negative.        PAST MEDICAL HISTORY     Past Medical History:   Diagnosis Date    Nieves's esophagus     Cholelithiasis 10/2013    CT scan    CKD (chronic kidney disease) stage 4, GFR 15-29 ml/min ([facility])     Dr. Jasson Zuñiga COPD (chronic obstructive pulmonary disease) ([facility] [address]     Endometriosis     Hypertension     Hypothyroidism 10/27/2015    Non-small cell lung cancer (NSCLC) ([facility] [address] 8/14/2019    Osteoporosis     Overactive bladder     Dr. Ena Lazaro Right bundle branch block     Thyroid disease     Urethral stricture          SURGICAL HISTORY     Past Surgical History:   Procedure Laterality Date    APPENDECTOMY      BREAST SURGERY  10/2002    breast lesion    BRONCHOSCOPY N/A 8/12/2019    BRONCHOSCOPY ENDOBRONCHIAL ULTRASOUND performed by Noah Jensen MD at [address] [place] [address] N/A 8/12/2019    BRONCHOSCOPY W/EBUS FNA performed by Noah Jensen MD at [facility]  prior to 2000    left hand    CATARACT REMOVAL  03, 04/2004    both eyes    COLONOSCOPY  2011 the following components:       Result Value    WBC 12.7 (*)     RBC 5.39 (*)     Hematocrit 49.9 (*)     RDW 16.7 (*)     MPV 11.2 (*)     Neutrophils Absolute 11.4 (*)     Lymphocytes Absolute 0.5 (*)     Anisocytosis Occasional (*)     Macrocytes Occasional (*)     All other components within normal limits    Narrative:     CALL  [facility] tel. [phone],  Rejected Test Name bnp,cmp,trop/Called to:Thais ARMENDARIZ, 09/16/2019 13:50,  by [facility]  Performed at:  [facility] [address],  [facility], [address]   Phone [phone]   BRAIN NATRIURETIC PEPTIDE - Abnormal; Notable for the following components:    Pro-BNP 23,258 (*)     All other components within normal limits    Narrative:     Performed at:  [facility] [address],  [facility], [address]   Phone [phone]   COMPREHENSIVE METABOLIC PANEL W/ REFLEX TO MG FOR LOW K - Abnormal; Notable for the following components:    Sodium 135 (*)     Glucose 105 (*)     BUN 51 (*)     CREATININE 1.6 (*)     GFR Non- 31 (*)     GFR  37 (*)     Total Protein 6.0 (*)     Alb 3.2 (*)     Total Bilirubin 1.1 (*)      (*)     AST 40 (*)     All other components within normal limits    Narrative:     Performed at:  [facility] [address],  [facility], [address]   Phone [phone]   TROPONIN - Abnormal; Notable for the following components:    Troponin 0.07 (*)     All other components within normal limits    Narrative:     Performed at:  [facility] [address],  [facility], [address]   Phone [phone]   CULTURE BLOOD #1   CULTURE BLOOD #2   LACTATE, SEPSIS    Narrative:     Ryan Mederos  Estevan Luh [phone],  Rejected Test Name bnp,cmp,trop/Called to:Thais ARMENDARIZ, 09/16/2019 13:50,  by [facility]  Performed at:  [facility]  [place] [address], [facility], [address] [facility]   Phone [phone]   PROTIME-INR    Narrative:     Lilly RODRIGUEZ tel. [phone],  Rejected Test Name bnp,cmp,trop/Called to:Thais ARMENDARIZ, 09/16/2019 13:50,  by [facility]  Performed at:  [facility]  [address] [facility], [address] [facility]   Phone [phone]    Narrative:     Lilly RODRIGUEZ tel. [phone],  Rejected Test Name bnp,cmp,trop/Called to:Thais ARMENDARIZ, 09/16/2019 13:50,  by [facility]  Performed at:  [facility]  [address] [facility], [address] [facility]   Phone [phone]   LACTATE, SEPSIS   URINE RT REFLEX TO CULTURE   APTT   APTT   APTT   TSH WITH REFLEX   TROPONIN   TROPONIN   COMPREHENSIVE METABOLIC PANEL W/ REFLEX TO MG FOR LOW K   CBC       All other labs were within normal range or not returned as of this dictation. EKG: All EKG's are interpreted by the Emergency Department Physician in the absence of a cardiologist.  Please see their note for interpretation of EKG. RADIOLOGY:   Non-plain film images such as CT, Ultrasound and MRI are read by the radiologist. Plain radiographic images are visualized andpreliminarily interpreted by the  ED Provider with the below findings:        Interpretation perthe Radiologist below, if available at the time of this note:    CT CHEST PULMONARY EMBOLISM W CONTRAST   Final Result   1. Bilateral pulmonary emboli. Right ventricular chamber dilatation suggests   right heart strain. Small foci of nonenhancing airspace disease in the right   middle lobe and lingula may indicate small areas of infarct   2. Right upper lobe collapse due to right hilar mass obstructing the right   upper lobe bronchus   3. Stable mediastinal adenopathy   4. Left lung nodules which may be inflammatory or metastatic   5. Moderate right pleural effusion, increased in size   6.  Stable pericardial effusion   7. 4 cm aneurysm of the ascending thoracic aorta         VL chewable tablet 81 mg (has no administration in time range)   pantoprazole (PROTONIX) tablet 40 mg (has no administration in time range)   morphine injection 4 mg (4 mg Intravenous Given 9/16/19 1416)   ondansetron (ZOFRAN) injection 4 mg (4 mg Intravenous Given 9/16/19 1416)   ipratropium-albuterol (DUONEB) nebulizer solution 1 ampule (1 ampule Inhalation Given 9/16/19 1417)   iopamidol (ISOVUE-370) 76 % injection 75 mL (75 mLs Intravenous Given 9/16/19 1512)   heparin (porcine) injection 6,380 Units (6,380 Units Intravenous Given 9/16/19 1811)       Patient is an 82-year-old female who presents to the ED with complaint of changes in activity level and energy. Has history of stage IV lung cancer with mets. Apparently has pleural effusion has been drained in the past.  Increasing fatigue, weakness and changes in activity. Brought to the ED for further evaluation and treatment. Upon examination patient noted to be tachycardic in the 120s. Noted to be hypoxic at 89% on room air. Denies any oxygen usage at home. Given patient's history and physical examination IV established and blood work obtained. Did give dose of pain medication and nausea medication here in ED. Given small dose of fluids. BMP 23,000. CMP showed creatinine 1.6 with GFR of 31. Otherwise unremarkable. Troponin 0 0.07. EKG interpreted by attending. CBC showed white count 12.7. Hemoglobin normal.  Platelets normal.  Lactic acid 1.4. Blood cultures pending. INR normal.  CT of the chest obtained. CT showed bilateral pulmonary emboli with right ventricular chamber dilation suggestive of right heart strain. Small foci of nonenhancing airspace disease in the right middle lobe and lingula which may indicate small areas of infarct. There does appear to be some right upper lobe collapse due to right hilar mass and obstructing the right upper lobe bronchus. Stable mediastinal lymphadenopathy noted.   Pulmonary nodules noted which may be

## 2019-09-17 PROBLEM — R53.81 MALAISE AND FATIGUE: Status: ACTIVE | Noted: 2019-09-17

## 2019-09-17 PROBLEM — R53.83 MALAISE AND FATIGUE: Status: ACTIVE | Noted: 2019-09-17

## 2019-09-17 PROBLEM — I26.09 ACUTE PULMONARY EMBOLISM WITH ACUTE COR PULMONALE (HCC): Status: ACTIVE | Noted: 2019-09-16

## 2019-09-17 LAB
A/G RATIO: 1.2 (ref 1.1–2.2)
ALBUMIN SERPL-MCNC: 3.2 G/DL (ref 3.4–5)
ALP BLD-CCNC: 76 U/L (ref 40–129)
ALT SERPL-CCNC: 109 U/L (ref 10–40)
ANION GAP SERPL CALCULATED.3IONS-SCNC: 12 MMOL/L (ref 3–16)
APTT: 38.6 SEC (ref 26–36)
APTT: >248 SEC (ref 26–36)
APTT: >248 SEC (ref 26–36)
AST SERPL-CCNC: 39 U/L (ref 15–37)
BILIRUB SERPL-MCNC: 0.8 MG/DL (ref 0–1)
BILIRUBIN URINE: ABNORMAL
BLOOD, URINE: NEGATIVE
BUN BLDV-MCNC: 51 MG/DL (ref 7–20)
CALCIUM SERPL-MCNC: 9.8 MG/DL (ref 8.3–10.6)
CHLORIDE BLD-SCNC: 98 MMOL/L (ref 99–110)
CLARITY: ABNORMAL
CO2: 25 MMOL/L (ref 21–32)
COLOR: YELLOW
COMMENT UA: ABNORMAL
CREAT SERPL-MCNC: 1.7 MG/DL (ref 0.6–1.2)
EPITHELIAL CELLS, UA: 4 /HPF (ref 0–5)
GFR AFRICAN AMERICAN: 35
GFR NON-AFRICAN AMERICAN: 29
GLOBULIN: 2.6 G/DL
GLUCOSE BLD-MCNC: 98 MG/DL (ref 70–99)
GLUCOSE URINE: NEGATIVE MG/DL
HCT VFR BLD CALC: 46.8 % (ref 36–48)
HEMOGLOBIN: 14.8 G/DL (ref 12–16)
HYALINE CASTS: 5 /LPF (ref 0–8)
KETONES, URINE: NEGATIVE MG/DL
LEFT VENTRICULAR EJECTION FRACTION HIGH VALUE: 50 %
LEFT VENTRICULAR EJECTION FRACTION MODE: NORMAL
LEUKOCYTE ESTERASE, URINE: ABNORMAL
LV EF: 50 %
LVEF MODALITY: NORMAL
MCH RBC QN AUTO: 29.4 PG (ref 26–34)
MCHC RBC AUTO-ENTMCNC: 31.6 G/DL (ref 31–36)
MCV RBC AUTO: 93.1 FL (ref 80–100)
MICROSCOPIC EXAMINATION: YES
NITRITE, URINE: NEGATIVE
PDW BLD-RTO: 16.3 % (ref 12.4–15.4)
PH UA: 5 (ref 5–8)
PLATELET # BLD: 109 K/UL (ref 135–450)
PMV BLD AUTO: 9.6 FL (ref 5–10.5)
POTASSIUM REFLEX MAGNESIUM: 5 MMOL/L (ref 3.5–5.1)
PROTEIN UA: 30 MG/DL
RBC # BLD: 5.03 M/UL (ref 4–5.2)
RBC UA: 1 /HPF (ref 0–4)
SODIUM BLD-SCNC: 135 MMOL/L (ref 136–145)
SPECIFIC GRAVITY UA: >1.03 (ref 1–1.03)
T4 FREE: 2.7 NG/DL (ref 0.9–1.8)
TOTAL PROTEIN: 5.8 G/DL (ref 6.4–8.2)
TROPONIN: 0.08 NG/ML
TSH REFLEX: 7.28 UIU/ML (ref 0.27–4.2)
URINE REFLEX TO CULTURE: YES
URINE TYPE: ABNORMAL
UROBILINOGEN, URINE: 1 E.U./DL
WBC # BLD: 9.7 K/UL (ref 4–11)
WBC UA: 21 /HPF (ref 0–5)

## 2019-09-17 PROCEDURE — 93970 EXTREMITY STUDY: CPT

## 2019-09-17 PROCEDURE — 80053 COMPREHEN METABOLIC PANEL: CPT

## 2019-09-17 PROCEDURE — 85730 THROMBOPLASTIN TIME PARTIAL: CPT

## 2019-09-17 PROCEDURE — 93306 TTE W/DOPPLER COMPLETE: CPT

## 2019-09-17 PROCEDURE — 6370000000 HC RX 637 (ALT 250 FOR IP): Performed by: INTERNAL MEDICINE

## 2019-09-17 PROCEDURE — C8929 TTE W OR WO FOL WCON,DOPPLER: HCPCS

## 2019-09-17 PROCEDURE — 85027 COMPLETE CBC AUTOMATED: CPT

## 2019-09-17 PROCEDURE — APPSS60 APP SPLIT SHARED TIME 46-60 MINUTES: Performed by: NURSE PRACTITIONER

## 2019-09-17 PROCEDURE — 2700000000 HC OXYGEN THERAPY PER DAY

## 2019-09-17 PROCEDURE — 84484 ASSAY OF TROPONIN QUANT: CPT

## 2019-09-17 PROCEDURE — 36415 COLL VENOUS BLD VENIPUNCTURE: CPT

## 2019-09-17 PROCEDURE — 99223 1ST HOSP IP/OBS HIGH 75: CPT | Performed by: INTERNAL MEDICINE

## 2019-09-17 PROCEDURE — 2500000003 HC RX 250 WO HCPCS: Performed by: INTERNAL MEDICINE

## 2019-09-17 PROCEDURE — 92610 EVALUATE SWALLOWING FUNCTION: CPT

## 2019-09-17 PROCEDURE — 1200000000 HC SEMI PRIVATE

## 2019-09-17 PROCEDURE — 94640 AIRWAY INHALATION TREATMENT: CPT

## 2019-09-17 PROCEDURE — 87086 URINE CULTURE/COLONY COUNT: CPT

## 2019-09-17 PROCEDURE — 6360000002 HC RX W HCPCS: Performed by: INTERNAL MEDICINE

## 2019-09-17 PROCEDURE — 94761 N-INVAS EAR/PLS OXIMETRY MLT: CPT

## 2019-09-17 PROCEDURE — 51798 US URINE CAPACITY MEASURE: CPT

## 2019-09-17 PROCEDURE — 81001 URINALYSIS AUTO W/SCOPE: CPT

## 2019-09-17 PROCEDURE — 92526 ORAL FUNCTION THERAPY: CPT

## 2019-09-17 PROCEDURE — 6360000002 HC RX W HCPCS: Performed by: PHYSICIAN ASSISTANT

## 2019-09-17 RX ORDER — HEPARIN SODIUM 10000 [USP'U]/100ML
18 INJECTION, SOLUTION INTRAVENOUS CONTINUOUS
Status: DISCONTINUED | OUTPATIENT
Start: 2019-09-17 | End: 2019-09-19 | Stop reason: ALTCHOICE

## 2019-09-17 RX ORDER — DILTIAZEM HYDROCHLORIDE 120 MG/1
120 CAPSULE, COATED, EXTENDED RELEASE ORAL DAILY
Status: DISCONTINUED | OUTPATIENT
Start: 2019-09-17 | End: 2019-09-19 | Stop reason: HOSPADM

## 2019-09-17 RX ORDER — DEXTROSE, SODIUM CHLORIDE, AND POTASSIUM CHLORIDE 5; .45; .15 G/100ML; G/100ML; G/100ML
INJECTION INTRAVENOUS CONTINUOUS
Status: DISCONTINUED | OUTPATIENT
Start: 2019-09-17 | End: 2019-09-18

## 2019-09-17 RX ADMIN — PANTOPRAZOLE SODIUM 40 MG: 40 TABLET, DELAYED RELEASE ORAL at 05:40

## 2019-09-17 RX ADMIN — IPRATROPIUM BROMIDE AND ALBUTEROL SULFATE 3 ML: .5; 3 SOLUTION RESPIRATORY (INHALATION) at 23:47

## 2019-09-17 RX ADMIN — SPIRONOLACTONE 25 MG: 25 TABLET ORAL at 08:01

## 2019-09-17 RX ADMIN — IPRATROPIUM BROMIDE AND ALBUTEROL SULFATE 3 ML: .5; 3 SOLUTION RESPIRATORY (INHALATION) at 19:20

## 2019-09-17 RX ADMIN — DILTIAZEM HYDROCHLORIDE 30 MG: 30 TABLET, FILM COATED ORAL at 05:40

## 2019-09-17 RX ADMIN — DRONABINOL 5 MG: 2.5 CAPSULE ORAL at 08:01

## 2019-09-17 RX ADMIN — ASPIRIN 81 MG 81 MG: 81 TABLET ORAL at 08:01

## 2019-09-17 RX ADMIN — HEPARIN SODIUM 3190 UNITS: 1000 INJECTION, SOLUTION INTRAVENOUS; SUBCUTANEOUS at 20:21

## 2019-09-17 RX ADMIN — POTASSIUM CHLORIDE, DEXTROSE MONOHYDRATE AND SODIUM CHLORIDE: 150; 5; 450 INJECTION, SOLUTION INTRAVENOUS at 12:34

## 2019-09-17 RX ADMIN — DILTIAZEM HYDROCHLORIDE 30 MG: 30 TABLET, FILM COATED ORAL at 01:29

## 2019-09-17 RX ADMIN — LEVOTHYROXINE SODIUM 100 MCG: 100 TABLET ORAL at 05:40

## 2019-09-17 RX ADMIN — IPRATROPIUM BROMIDE AND ALBUTEROL SULFATE 3 ML: .5; 3 SOLUTION RESPIRATORY (INHALATION) at 15:26

## 2019-09-17 RX ADMIN — HEPARIN SODIUM 8 UNITS/KG/HR: 10000 INJECTION, SOLUTION INTRAVENOUS at 20:23

## 2019-09-17 RX ADMIN — DILTIAZEM HYDROCHLORIDE 120 MG: 120 CAPSULE, COATED, EXTENDED RELEASE ORAL at 15:24

## 2019-09-17 RX ADMIN — IPRATROPIUM BROMIDE AND ALBUTEROL SULFATE 3 ML: .5; 3 SOLUTION RESPIRATORY (INHALATION) at 08:15

## 2019-09-17 RX ADMIN — DILTIAZEM HYDROCHLORIDE 30 MG: 30 TABLET, FILM COATED ORAL at 12:07

## 2019-09-17 ASSESSMENT — PAIN SCALES - GENERAL: PAINLEVEL_OUTOF10: 0

## 2019-09-17 NOTE — CONSULTS
accessory muscle usage.  + RUL wheezes. No rales. No Rhonchi. Cardiovascular: Normal S1S2. No digit clubbing. No digit cyanosis. No LE edema. Gastrointestinal: No mass palpated. No tenderness palpated. No umbilical hernia. Lymphatic: No cervical or supraclavicular adenopathy. Skin: No rash on the exposed extremities. No Nodules or induration on exposed extremities. Psychiatric: No anxiety or Agitation. Alert and Oriented to person, place and time. CBC:   Recent Labs     09/16/19  1332 09/17/19  0652   WBC 12.7* 9.7   HGB 15.7 14.8   HCT 49.9* 46.8   MCV 92.5 93.1    109*     BMP:   Recent Labs     09/16/19  1428 09/17/19  0652   * 135*   K 4.9 5.0   CL 99 98*   CO2 25 25   BUN 51* 51*   CREATININE 1.6* 1.7*        Recent Labs     09/16/19  1428 09/17/19  0652   AST 40* 39*   * 109*   BILITOT 1.1* 0.8   ALKPHOS 81 76     Recent Labs     09/16/19  1332   PROTIME 11.0   INR 0.96     No results for input(s): NITRITE, COLORU, PHUR, LABCAST, WBCUA, RBCUA, MUCUS, TRICHOMONAS, YEAST, BACTERIA, CLARITYU, SPECGRAV, LEUKOCYTESUR, UROBILINOGEN, BILIRUBINUR, BLOODU, GLUCOSEU, AMORPHOUS in the last 72 hours. Invalid input(s): KETONESU  No results for input(s): PHART, EJP4EJC, PO2ART in the last 72 hours. Chest imaging was reviewed by me and showedCTPA  1. Bilateral pulmonary emboli.  Right ventricular chamber dilatation suggests   right heart strain.  Small foci of nonenhancing airspace disease in the right   middle lobe and lingula may indicate small areas of infarct   2. Right upper lobe collapse due to right hilar mass obstructing the right   upper lobe bronchus   3. Stable mediastinal adenopathy   4. Left lung nodules which may be inflammatory or metastatic   5. Moderate right pleural effusion, increased in size   6.  Stable pericardial effusion   7. 4 cm aneurysm of the ascending thoracic aorta         ASSESSMENT:  · Acute PE  · Acute hypoxic respiratory failure  · Stage IV lung cancer -on

## 2019-09-17 NOTE — PROGRESS NOTES
Hospitalist Progress Note      PCP: Roxann Valencia MD    Date of Admission: 9/16/2019    LOS: 1    Chief Complaint:   Chief Complaint   Patient presents with    Fatigue     pt has stage 4 lung ca, first immunotherapy last Tuesday, patient having increased weakness since treatment, needing more assistance daily, today having a hard time moving, can only walk a few steps then she has to sit down. started last Friday. pt transported from home by [facility]. Case Summary:   82-year-old lady with history of chronic kidney disease, hypertension, COPD, non-small cell lung cancer stage IV on immunotherapy who presented with generalized malaise and weakness with fatigue worsening over the last 3 to 4 days and presented to the ER for evaluation where she was found to be tachycardic with hypoxia prompting CT pulmonary angiogram which showed bilateral pulmonary emboli with right ventricular strain. Active Hospital Problems    Diagnosis Date Noted   Paloma Honer and fatigue [R53.81, R53.83] 09/17/2019    Stage 4 malignant neoplasm of lung (HCC) [C34.90]     Acute pulmonary embolism with acute cor pulmonale (HCC) [I26.09] 09/16/2019    CKD (chronic kidney disease), stage III (HCC) [N18.3] 09/04/2019    Acquired hypothyroidism [E03.9] 11/03/2016         Principal Problem:    Acute pulmonary embolism with acute cor pulmonale ([address]): Noted with right heart strain on CT chest.  Troponin leak noted at 0.08.  -Await echocardiogram  - Continue anticoagulation  - Vascular, oncology/hematology and pulmonology consult    Active Problems:    Acquired hypothyroidism: On thyroid replacement therapy    CKD (chronic kidney disease), stage III [facility]): Stable will monitor and consult primary nephrologist    Stage 4 malignant neoplasm of lung [facility]): On immunotherapy following with oncology. Will consult oncology and patient    Malaise and fatigue: In the setting of underlying malignancy and decreased oral intake.   -Encourage oral intake.  -Out of bed as tolerated  -IV hydration  -PT OT evaluation        Medications:  Reviewed  Infusion Medications    heparin (porcine) 6 Units/kg/hr (09/17/19 1109)    dextrose 5% and 0.45% NaCl with KCl 20 mEq 50 mL/hr at 09/17/19 1234     Scheduled Medications    diltiazem  120 mg Oral Daily    dronabinol  5 mg Oral Daily    ipratropium-albuterol  1 vial Inhalation Q4H    levothyroxine  100 mcg Oral Daily    spironolactone  25 mg Oral Daily    sodium chloride flush  10 mL Intravenous 2 times per day    aspirin  81 mg Oral Daily    pantoprazole  40 mg Oral QAM AC     PRN Meds: heparin (porcine), heparin (porcine), sodium chloride flush, magnesium hydroxide, ondansetron, HYDROcodone 5 mg - acetaminophen      DVT Prophylaxis: Heparin drip  Diet: DIET RENAL;  Dietary Nutrition Supplements: Standard High Calorie Oral Supplement  Code Status: Full Code    Dispo: Anticipate discharge in the next 72hrs    ____________________________________________________________________________    Subjective:   Overnight Events:   Uneventful overnight  Generalized weakness malaise      Physical Exam Performed:  /80   Pulse 95   Temp 97.9 °F (36.6 °C) (Oral)   Resp 18   Ht 5' 8\" (1.727 m)   Wt 170 lb 1.6 oz (77.2 kg)   LMP 07/20/1991 (Exact Date)   SpO2 98%   BMI 25.86 kg/m²   General appearance: No apparent distress, appears stated age and cooperative. HEENT: Normocephalic, atraumatic, MMM, No sclera icterus/conjuctival palor  Neck: Supple, no thyromegally. No jugular venous distention. Respiratory:  Normal respiratory effort. Clear to auscultation, no Rales/Wheezes/Rhonchi. Cardiovascular: S1/S2 without murmurs, rubs or gallops. RRR  Abdomen: Soft, non-tender, non-distended, bowel sounds present. Musculoskeletal: No clubbing, cyanosis or edema bilaterally. Skin: Skin color, texture, turgor normal.  No rashes or lesions.   Neurologic:  Cranial nerves: II-XII intact, TAYLA, No focal

## 2019-09-17 NOTE — PROGRESS NOTES
Assessment completed see doc flow sheets. VSS, Patient has no reports of pain or discomfort. Patient encouraged to call nurse if assistance is needed. Call light in reach .  JOVITA LauN, RN

## 2019-09-17 NOTE — CONSULTS
Palliative Care:    Palliative Care consult for Goals of Care, Code Status, Family support. Would like information on hospice. Jass Terrell is a 82 y.o. female with past medical history of Nieves's esophagitis, CKD, COPD, hypertension, thyroid disease and overactive bladder who presents the ED with complaint of fatigue and weakness. Patient states she has stage IV lung cancer. States she was diagnosed back in August with stage IV lung cancer. Has mets to the intrathoracic structures and also to the pelvic bone. States he is followed up with oncology, Dr. Ryan Novak, and had first dose of immunotherapy on Tuesday. Family states since first dose of immunotherapy has had increasing weakness, fatigue, decreased oral intake, nausea and difficulty ambulating. Has had nonproductive cough. States diffuse pain rated 4/10. Denies localization of symptoms. CTA OF THE CHEST 9/16/2019  Impression   1. Bilateral pulmonary emboli.  Right ventricular chamber dilatation suggests   right heart strain.  Small foci of nonenhancing airspace disease in the right   middle lobe and lingula may indicate small areas of infarct   2. Right upper lobe collapse due to right hilar mass obstructing the right   upper lobe bronchus   3. Stable mediastinal adenopathy   4. Left lung nodules which may be inflammatory or metastatic   5. Moderate right pleural effusion, increased in size   6. Stable pericardial effusion   7. 4 cm aneurysm of the ascending thoracic aorta     ULTRASOUNDGUIDED RIGHT THORACENTESIS 8/30/2019  mpression   Successful ultrasound guided thoracentesis.         400 cc dark yellow somewhat roberto colored fluid removed.       FINDINGS:   A total of 400 cc was removed.      Past Medical History:   has a past medical history of Nieves's esophagus, Cholelithiasis, CKD (chronic kidney disease) stage 4, GFR 15-29 ml/min ([facility]), COPD (chronic obstructive pulmonary disease) ([facility] [address] Endometriosis, Hypertension, Hypothyroidism,

## 2019-09-17 NOTE — PLAN OF CARE
Problem: Falls - Risk of: Intervention: Assess risk factors for falls  Note:   Medium fall risk     Problem: Risk for Impaired Skin Integrity  Goal: Tissue integrity - skin and mucous membranes  Description  Structural intactness and normal physiological function of skin and  mucous membranes.   Intervention: SKIN ASSESSMENT  Note:   Pt has intact skin no change from previous assessment

## 2019-09-17 NOTE — PLAN OF CARE
Nutrition Problem: Inadequate energy intake  Intervention: Food and/or Nutrient Delivery: Continue current diet, Start ONS  Nutritional Goals: PO 50% or more at meals/supp

## 2019-09-17 NOTE — PLAN OF CARE
Problem: Falls - Risk of:  Goal: Will remain free from falls  Description  Will remain free from falls  Outcome: Ongoing     Problem: Risk for Impaired Skin Integrity  Goal: Tissue integrity - skin and mucous membranes  Description  Structural intactness and normal physiological function of skin and  mucous membranes. Outcome: Ongoing     Problem: Pain:  Goal: Pain level will decrease  Description  Pain level will decrease  Outcome: Ongoing     Problem: Nutrition  Goal: Optimal nutrition therapy  9/17/2019 1013 by Eufemia Molina RN  Outcome: Ongoing     Problem: Falls - Risk of: Intervention: Assess risk factors for falls  9/17/2019 0505 by Jean Claude Jimenez RN  Note:   Medium fall risk   Patient has poor PO intake. Daughter is at bedside. Call light within reach.

## 2019-09-17 NOTE — PROGRESS NOTES
excluded.       4.  Perihilar predominant right upper and superior middle lobe   consolidations.  Findings may relate to postobstructive atelectasis. Underlying infection or malignant involvement should also be considered.       5.  Mild thickening of the left adrenal gland could relate to hyperplasia. Continued attention on follow-up recommended.       6.   Incidental findings include atherosclerosis, ectatic ascending thoracic   aorta, degenerative osseous changes, thoracic spinal stimulator leads,   cholelithiasis and small hiatal hernia.       RECOMMENDATIONS:   Follow-up PET-CT.  Transbronchial tissue sampling may also be helpful.         PET/CT 8/21/19    1. Metabolically active right upper lobe mass consistent with history of lung cancer with metabolically active right hilar, mediastinal, and bilateral supraclavicular lymph node metastases and a skeletal metastasis. 2. FDG activity associated with the moderate right pleural effusion raises the possibility of malignant effusion. 3. Small pericardial effusion. Pathology:   8/12/19    EBUS Path:  Account No:    [de-identified]                  Collected:     08/12/2019  Med Rec No:    [id number]                 Received:      08/12/2019  Completed:     08/13/2019    FINAL DIAGNOSIS:    A. Lung, right upper lobe, brushing:       -  Non-small cell carcinoma, favor adenocarcinoma    B. Lymph node, subcarinal, endoscopic ultrasound guided fine needle  aspiration:       -  Non-small cell carcinoma, favor adenocarcinoma    C. Lung, right upper lobe, bronchial alveolar lavage:       -  Non-small cell carcinoma, favor adenocarcinoma    IMPRESSION/RECOMMENDATIONS:    Active Problems:    Pulmonary embolism, bilateral (HCC)  Resolved Problems:    * No resolved hospital problems.  *       NSCLC-IV  - PET/CT:Metabolically active right upper lobe mass consistent with history of lung cancer with metabolically active right hilar, mediastinal, and bilateral

## 2019-09-17 NOTE — PROGRESS NOTES
Called lab to verify that PTT was drawn at 0800. They said it was. Said result first cam back high, >248. They had not called because they were going to run the result again. Explained to them that PTT was high earlier and that patient is on Heparin. Heparin gtt stopped for one hour per protocol.

## 2019-09-17 NOTE — CONSULTS
MD Diony Olson MD Chalmers Hun, MD               Office: [phone]                      Fax: [phone] INITIAL CONSULT NOTE:     PATIENT NAME: Andressa Saeed  : 1935  MRN: [id number]  SERVICE DATE: 2019   SERVICE TIME: 12:21 PM    REASON FOR CONSULT: I am asked to see this patient in consultation for my opinion regarding management of Acute Kidney Injury . My recommendations will be communicated by way of shared medical record. PRIMARY CARE PHYSICIAN: Julia Chicas MD    IMPRESSION:       CEDRIC on CKD   Non-oligouric   BL SCR ~ 1.3 w/ CKD-3B/4, ------> 1.7,   History of advanced CKD stage IIIb-4, follows with Dr. Hannah Moreira,  Currently renal function stable to improving. UA results reviewed - 30 pro, mode LE , some WBC --- no growth on u. cx so far -- continue to monitor ? TI disease. SOB - admitted with Acute pulmonary embolism with acute cor pulmonale as with right heart strain on CT chest.  also w/ a Troponin leak , needing vascular / cardio / hem eval.     No plans for an embolectomy, heparin drip needed,  W/o H/O NSCLC-IV , recurrent Rt side pleural effusion-Malignant  Hospice/palliative consult      RECOMMENDATIONS:   On hypotonic fluid, with decreased oral intake, on D5 w/ Half saline + K:  IV,   continue to monitor for hyponatremia,  Status post CT with IV contrast, so continue to monitor renal function, on IV fluids, might be too late with IV fluids, to prevent CI-CEDRIC. -- change IVFs to only 0.45%saline , w/o D5 and KCL as stable CEDRIC, but still needs IVFs, no need of KCL w/ K in to 5s w/ already on SPNL.        Associated problems:   - Azotemia: chronic - stable  - Electrolytes: K: wnl   other lytes stable-    - Volume status: mild hypo-volemic  Na: hyponatremia - mild - monitoring   BP: accepted   - Acid-Base: wnl  - Anemia: wnl      Other problems:  Patient Active Problem List   Diagnosis    Right bundle branch block    Overactive bladder    COPD (chronic obstructive pulmonary disease) (HCC)    Stage 4 chronic kidney disease (HCC)    Osteoporosis    Nieves's esophagus    Cholelithiasis    Hearing loss    Essential hypertension    Hearing loss due to cerumen impaction    Impacted cerumen of both ears    Acquired hypothyroidism    Conductive hearing loss of both ears    Isolated proteinuria    Lung mass    Mediastinal adenopathy    Hilar adenopathy    Pleural effusion    Pericardial effusion    Atelectasis    Former smoker    Abnormal weight loss    SOB (shortness of breath)    Pneumonia    Moderate malnutrition (HCC)    Non-small cell lung cancer (NSCLC) (HCC)    [facility]EDR[facility] injury) ([facility] [address]    CKD (chronic kidney disease), stage III (HCC)    Acute pulmonary embolism with acute cor pulmonale (HCC)    Stage 4 malignant neoplasm of lung (HCC)    Malaise and fatigue    Acute deep vein thrombosis (DVT) of popliteal vein of left lower extremity (HCC)     : other supportive care :   - Check daily renal function panel with electrolytes-phosphorus  - Strict monitoring of I/Os, daily weight  - Renal feeds/diet  - Current medications reviewed. - Nephrotoxic medications have been discontinued. - Dose adjusted and appropriate. - Dose meds for eGFR <15 mL/min/1.73m2 during CEDRIC    - Avoid heavy opioids due to renal failure - may use very low dose dilaudid / fentanyl with close monitoring of CNS and respiratory depression. Please refer to the orders. High Complexity. Multiple complex problems. Discussed with patient, family - multiple members and treatment team- nurse   Severally ill, at risk of impending organ failure needing  higher level of care/monitoring.    Time spent 35 minutes that included face-to-face meeting/discussion with patient, patient's family, and treatment team (including primary/referring team and other consultants; included coordination of care with the lesion     1. Bilateral pulmonary emboli. Right ventricular chamber dilatation suggests right heart strain. Small foci of nonenhancing airspace disease in the right middle lobe and lingula may indicate small areas of infarct 2. Right upper lobe collapse due to right hilar mass obstructing the right upper lobe bronchus 3. Stable mediastinal adenopathy 4. Left lung nodules which may be inflammatory or metastatic 5. Moderate right pleural effusion, increased in size 6. Stable pericardial effusion 7. 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 Universal protocol was performed. The right chest was prepped and draped in sterile fashion and local anesthesia was achieved with lidocaine. An 5 Guinean needle sheath was advanced under ultrasound guidance into pleural effusion and thoracentesis was performed. The patient tolerated the procedure well. 400 cc dark yellow somewhat roberto colored fluid removed. FINDINGS: A total of 400 cc was removed. Successful ultrasound guided thoracentesis.      Vl Extremity Venous Bilateral    Result Date: 9/17/2019  Lower Extremities DVT Study  Demographics   Patient Name      Gabi Khalil   Date of Study     09/17/2019         Gender              Female   Patient Number    [id number]         Date of Birth       1935   Visit Number      [id number]          Age                 82 year(s)   Accession Number  [id number]          Room Number         0178   Corporate ID [id number]             Sonographer         Ximena Gauthier                                                           S   Ordering          Richard Guillermo MD  Interpreting        [facility]  Physician                            Physician           Yenny Almanzar MD,                                                           [facility]  Procedure Type of Study:   Veins:Lower Extremities DVT Study, VASC EXTREMITY VENOUS DUPLEX BILATERAL. Vascular Sonographer Report  Additional Indications:Pulmonary embolism Impressions Right Impression No evidence of deep vein or superficial vein thrombosis involving the right lower extremity. Pulsatile venous flow noted in the right lower extremity. Left Impression Acute totally occluding deep vein thrombosis involving the left popliteal vein. No other evidence of deep vein or superficial vein thrombosis involving the left lower extremity. Pulsatile venous flow noted in the left lower extremity. Verbal: Polina Reading Conclusions   Summary   -Acute totally occluding deep vein thrombosis involving the left popliteal  vein. -Pulsatile venous flow noted in the bilateral lower extremities. Signature   ------------------------------------------------------------------  Electronically signed by Yenny Almanzar MD, [facility] (Interpreting  physician) on 09/17/2019 at 05:24 PM  ------------------------------------------------------------------  Patient Status:Routine. [address] [place] [address] Vascular Lab. Technical Quality:Adequate visualization. Velocities are measured in cm/s ; Diameters are measured in mm Right Lower Extremities DVT Study Measurements Right 2D Measurements +------------------------+----------+---------------+----------+ ! Location                ! Visualized! Compressibility! Thrombosis! +------------------------+----------+---------------+----------+ ! Sapheno Femoral Junction! Yes       ! Yes            ! None      ! !No             !Acute     ! +------------------------+----------+---------------+----------+ ! GSV Below Knee          ! Yes       ! Yes            ! None      ! +------------------------+----------+---------------+----------+ ! Gastroc                 ! Yes       ! Yes            ! None      ! +------------------------+----------+---------------+----------+ ! Soleal                  !Yes       ! Yes            ! None      ! +------------------------+----------+---------------+----------+ ! PTV                     ! Yes       ! Yes            ! None      ! +------------------------+----------+---------------+----------+ ! Peroneal                !Yes       ! Yes            ! None      ! +------------------------+----------+---------------+----------+ ! GSV Calf                ! Yes       ! Yes            ! None      ! +------------------------+----------+---------------+----------+ ! SSV                     ! Yes       ! Yes            ! None      ! +------------------------+----------+---------------+----------+ Left Doppler Measurements +------------------------+---------+------+------------+ ! Location                ! Signal   !Reflux! Reflux (sec)! +------------------------+---------+------+------------+ ! Sapheno Femoral Junction! Pulsatile!      !            ! +------------------------+---------+------+------------+ ! Common Femoral          !Pulsatile!      !            ! +------------------------+---------+------+------------+ ! Prox Femoral            !Pulsatile!      !            ! +------------------------+---------+------+------------+ ! Deep Femoral            !Pulsatile!      !            ! +------------------------+---------+------+------------+ ! Popliteal               !Absent   !      !            ! +------------------------+---------+------+------------+    Pet Ct Skull Base To Mid Thigh    Result Date: 8/22/2019  EXAMINATION: WHOLE BODY PET/CT 8/21/2019 TECHNIQUE: Following IV injection of 14.5 mCi of F 18 -FDG, PET  tumor imaging was acquired from the base of the skull to the mid thighs. Computed tomography was used for purposes of attenuation correction and anatomic localization. Fusion imaging was utilized for interpretation. Uptake time 71 mins. Glucose level 86 mg/dl. COMPARISON: CT scan of the chest 08/11/2019 HISTORY: ORDERING SYSTEM PROVIDED HISTORY: Cancer of bronchus of left upper lobe [facility]) TECHNOLOGIST PROVIDED HISTORY: Reason for Exam: Staging lung ca Type of Exam: Initial Relevant Medical/Surgical History: No prior PET scans, Initial treatment strategy. FINDINGS: HEAD/NECK: Metabolically active bilateral supraclavicular lymph nodes are present. Representative left supraclavicular lymph node measures 1.4 cm with SUV max of 4.8. CHEST: Heterogeneous FDG activity corresponding to the mass and atelectasis in the medial right upper lobe, SUV max 16.1. There are more atelectatic changes in the right upper lobe than on the prior exam. Metabolically active right hilar lymphadenopathy causing some narrowing of the right bronchi, SUV max of 15.4. Multiple metabolically active mediastinal lymph nodes including subcarinal, right paratracheal, and AP window lymph nodes. Subcarinal lymph node measures 2.1 cm in short axis dimension with SUV max of 14.2. There is FDG activity associated with the moderate right pleural effusion. ABDOMEN/PELVIS: No metabolically active adrenal mass. No metabolically active intraperitoneal mass. No metabolically active abdominal or pelvic lymphadenopathy. Physiologic activity in the gastrointestinal and genitourinary systems. BONES/SOFT TISSUE: Focal FDG activity corresponds to a small lytic lesion in the left iliac crest. INCIDENTAL CT FINDINGS: Atherosclerotic calcifications within the coronary arteries and the aorta and its branches. Small pericardial effusion. Cholelithiasis. Left renal cysts. Spinal stimulator device. Status post hysterectomy.      1.  Metabolically active right upper lobe

## 2019-09-17 NOTE — PROGRESS NOTES
minutes    Total Treatment Time: 25 minutes    Discharge Recommendations: Speech Therapy for Speech/Dysphagia treatment at discharge pending diet tolerance.      Camelia Ash M.A., [address] [place] [address]  Speech-Language Pathologist

## 2019-09-17 NOTE — PLAN OF CARE
MODERATE*   UROBILINOGEN 1.0   BILIRUBINUR SMALL*   BLOODU Negative   GLUCOSEU Negative       LIVER PROFILE:   Recent Labs     09/16/19  1428 09/17/19  0652   AST 40* 39*   * 109*   BILITOT 1.1* 0.8   ALKPHOS 81 76     PT/INR:    Lab Results   Component Value Date    PROTIME 11.0 09/16/2019    PROTIME 11.4 08/30/2019    PROTIME 10.6 09/25/2013    INR 0.96 09/16/2019    INR 1.00 08/30/2019    INR 0.94 09/25/2013     PTT:    Lab Results   Component Value Date    APTT >248.0 09/17/2019    APTT >248.0 09/17/2019    APTT >248.0 09/16/2019     ULISES:  No results found for: ANATITER, ULISES            RADIOLOGY:    Xr Chest Standard (2 Vw)    Result Date: 9/7/2019  EXAMINATION: TWO XRAY VIEWS OF THE CHEST 9/6/2019 4:36 pm COMPARISON: August 30, 2019 HISTORY: ORDERING SYSTEM PROVIDED HISTORY: H/O pleural effusion TECHNOLOGIST PROVIDED HISTORY: Reason for exam:->assess for pleural effusion Reason for Exam: F/u thoracentesis 1 week ago. Patient has stage 4 lung CA per daughter. Acuity: Chronic Type of Exam: Subsequent/Follow-up FINDINGS: Cardiac silhouette is enlarged. Elevation the right hemidiaphragm, unchanged. Fullness of the right hilum in opacity in the right upper lung medially, unchanged. No significant change in appearance of the chest.     Xr Chest 1 Vw    Result Date: 8/30/2019  EXAMINATION: ONE XRAY VIEW OF THE CHEST 8/30/2019 11:32 am COMPARISON: August 11, 2019 radiograph and PET-CT August 21, 2019 HISTORY: ORDERING SYSTEM PROVIDED HISTORY: Evaluate for possible pneumothorax post procedure. right thora, 400 cc . Dr Arnold Lundy TECHNOLOGIST PROVIDED HISTORY: CXR immediate post procedure. Reason for exam:->Evaluate for possible pneumothorax post procedure. right thora, 400 cc . Dr Arnold Lundy Reason for Exam: Evaluate for possible pneumothorax post procedure. right thora, 400 cc . Dr Arnold Lundy Acuity: Unknown Type of Exam: Unknown FINDINGS: Cardiac silhouette is enlarged. No pneumothorax.   Rounded opacity in the aorta and its branches. Small pericardial effusion. Cholelithiasis. Left renal cysts. Spinal stimulator device. Status post hysterectomy. 1.  Metabolically active right upper lobe mass consistent with history of lung cancer with metabolically active right hilar, mediastinal, and bilateral supraclavicular lymph node metastases and a skeletal metastasis. 2.  FDG activity associated with the moderate right pleural effusion raises the possibility of malignant effusion. 3.  Small pericardial effusion. Imaging Results.   Chest X Ray reviwed by me          Electronically Signed: Samina Law MD 9/17/2019 1:56 PM

## 2019-09-18 ENCOUNTER — TELEPHONE (OUTPATIENT)
Dept: PULMONOLOGY | Age: 84
End: 2019-09-18

## 2019-09-18 DIAGNOSIS — J90 PLEURAL EFFUSION: Primary | ICD-10-CM

## 2019-09-18 PROBLEM — I82.432 ACUTE DEEP VEIN THROMBOSIS (DVT) OF POPLITEAL VEIN OF LEFT LOWER EXTREMITY (HCC): Status: ACTIVE | Noted: 2019-09-18

## 2019-09-18 LAB
ALBUMIN SERPL-MCNC: 3.4 G/DL (ref 3.4–5)
ANION GAP SERPL CALCULATED.3IONS-SCNC: 10 MMOL/L (ref 3–16)
APTT: 35.5 SEC (ref 26–36)
APTT: 73.8 SEC (ref 26–36)
APTT: 78.7 SEC (ref 26–36)
APTT: >248 SEC (ref 26–36)
BUN BLDV-MCNC: 43 MG/DL (ref 7–20)
CALCIUM SERPL-MCNC: 9.4 MG/DL (ref 8.3–10.6)
CHLORIDE BLD-SCNC: 101 MMOL/L (ref 99–110)
CO2: 26 MMOL/L (ref 21–32)
CREAT SERPL-MCNC: 1.6 MG/DL (ref 0.6–1.2)
GFR AFRICAN AMERICAN: 37
GFR NON-AFRICAN AMERICAN: 31
GLUCOSE BLD-MCNC: 121 MG/DL (ref 70–99)
HCT VFR BLD CALC: 43.3 % (ref 36–48)
HEMOGLOBIN: 13.9 G/DL (ref 12–16)
MCH RBC QN AUTO: 29.6 PG (ref 26–34)
MCHC RBC AUTO-ENTMCNC: 32 G/DL (ref 31–36)
MCV RBC AUTO: 92.6 FL (ref 80–100)
PDW BLD-RTO: 16.2 % (ref 12.4–15.4)
PHOSPHORUS: 2.3 MG/DL (ref 2.5–4.9)
PLATELET # BLD: 122 K/UL (ref 135–450)
PMV BLD AUTO: 10.6 FL (ref 5–10.5)
POTASSIUM SERPL-SCNC: 5.1 MMOL/L (ref 3.5–5.1)
RBC # BLD: 4.68 M/UL (ref 4–5.2)
SODIUM BLD-SCNC: 137 MMOL/L (ref 136–145)
URINE CULTURE, ROUTINE: NORMAL
WBC # BLD: 8.2 K/UL (ref 4–11)

## 2019-09-18 PROCEDURE — 6370000000 HC RX 637 (ALT 250 FOR IP): Performed by: INTERNAL MEDICINE

## 2019-09-18 PROCEDURE — 97166 OT EVAL MOD COMPLEX 45 MIN: CPT

## 2019-09-18 PROCEDURE — 2500000003 HC RX 250 WO HCPCS: Performed by: INTERNAL MEDICINE

## 2019-09-18 PROCEDURE — 1200000000 HC SEMI PRIVATE

## 2019-09-18 PROCEDURE — 2580000003 HC RX 258: Performed by: INTERNAL MEDICINE

## 2019-09-18 PROCEDURE — 94640 AIRWAY INHALATION TREATMENT: CPT

## 2019-09-18 PROCEDURE — 94761 N-INVAS EAR/PLS OXIMETRY MLT: CPT

## 2019-09-18 PROCEDURE — 85027 COMPLETE CBC AUTOMATED: CPT

## 2019-09-18 PROCEDURE — 51798 US URINE CAPACITY MEASURE: CPT

## 2019-09-18 PROCEDURE — 2700000000 HC OXYGEN THERAPY PER DAY

## 2019-09-18 PROCEDURE — 85730 THROMBOPLASTIN TIME PARTIAL: CPT

## 2019-09-18 PROCEDURE — 97530 THERAPEUTIC ACTIVITIES: CPT

## 2019-09-18 PROCEDURE — 92526 ORAL FUNCTION THERAPY: CPT

## 2019-09-18 PROCEDURE — 97162 PT EVAL MOD COMPLEX 30 MIN: CPT

## 2019-09-18 PROCEDURE — 36415 COLL VENOUS BLD VENIPUNCTURE: CPT

## 2019-09-18 PROCEDURE — 6360000002 HC RX W HCPCS: Performed by: INTERNAL MEDICINE

## 2019-09-18 PROCEDURE — 80069 RENAL FUNCTION PANEL: CPT

## 2019-09-18 PROCEDURE — 6360000002 HC RX W HCPCS: Performed by: PHYSICIAN ASSISTANT

## 2019-09-18 PROCEDURE — 99232 SBSQ HOSP IP/OBS MODERATE 35: CPT | Performed by: INTERNAL MEDICINE

## 2019-09-18 RX ORDER — SODIUM CHLORIDE 450 MG/100ML
INJECTION, SOLUTION INTRAVENOUS CONTINUOUS
Status: ACTIVE | OUTPATIENT
Start: 2019-09-18 | End: 2019-09-19

## 2019-09-18 RX ADMIN — IPRATROPIUM BROMIDE AND ALBUTEROL SULFATE 3 ML: .5; 3 SOLUTION RESPIRATORY (INHALATION) at 15:36

## 2019-09-18 RX ADMIN — SODIUM CHLORIDE: 4.5 INJECTION, SOLUTION INTRAVENOUS at 12:18

## 2019-09-18 RX ADMIN — DILTIAZEM HYDROCHLORIDE 120 MG: 120 CAPSULE, COATED, EXTENDED RELEASE ORAL at 09:48

## 2019-09-18 RX ADMIN — DRONABINOL 5 MG: 2.5 CAPSULE ORAL at 09:49

## 2019-09-18 RX ADMIN — IPRATROPIUM BROMIDE AND ALBUTEROL SULFATE 3 ML: .5; 3 SOLUTION RESPIRATORY (INHALATION) at 11:55

## 2019-09-18 RX ADMIN — SPIRONOLACTONE 25 MG: 25 TABLET ORAL at 09:48

## 2019-09-18 RX ADMIN — HEPARIN SODIUM 8.02 UNITS/KG/HR: 10000 INJECTION, SOLUTION INTRAVENOUS at 06:35

## 2019-09-18 RX ADMIN — HEPARIN SODIUM 6380 UNITS: 1000 INJECTION, SOLUTION INTRAVENOUS; SUBCUTANEOUS at 09:50

## 2019-09-18 RX ADMIN — HEPARIN SODIUM 12.3 UNITS/KG/HR: 10000 INJECTION, SOLUTION INTRAVENOUS at 09:51

## 2019-09-18 RX ADMIN — ASPIRIN 81 MG 81 MG: 81 TABLET ORAL at 09:49

## 2019-09-18 RX ADMIN — LEVOTHYROXINE SODIUM 100 MCG: 100 TABLET ORAL at 05:41

## 2019-09-18 RX ADMIN — IPRATROPIUM BROMIDE AND ALBUTEROL SULFATE 3 ML: .5; 3 SOLUTION RESPIRATORY (INHALATION) at 08:50

## 2019-09-18 RX ADMIN — IPRATROPIUM BROMIDE AND ALBUTEROL SULFATE 3 ML: .5; 3 SOLUTION RESPIRATORY (INHALATION) at 23:44

## 2019-09-18 RX ADMIN — POTASSIUM CHLORIDE, DEXTROSE MONOHYDRATE AND SODIUM CHLORIDE: 150; 5; 450 INJECTION, SOLUTION INTRAVENOUS at 05:41

## 2019-09-18 RX ADMIN — Medication 10 ML: at 09:49

## 2019-09-18 RX ADMIN — IPRATROPIUM BROMIDE AND ALBUTEROL SULFATE 3 ML: .5; 3 SOLUTION RESPIRATORY (INHALATION) at 20:04

## 2019-09-18 RX ADMIN — PANTOPRAZOLE SODIUM 40 MG: 40 TABLET, DELAYED RELEASE ORAL at 05:41

## 2019-09-18 ASSESSMENT — PAIN SCALES - GENERAL
PAINLEVEL_OUTOF10: 0

## 2019-09-18 NOTE — PROGRESS NOTES
Advanced Care Planning Note.     Purpose of Encounter: Advanced care planning in light of Acute pulmonary embolism with acute cor pulmonale, CKD stage III, Stage 4 malignant neoplasm of lung,  Acute DVT left lower extremity  Parties In Attendance: Daughter  Decisional Capacity: No  Subjective: Patient with malaise, lethargy and Acute pulmonary embolism, Stage 4 malignant neoplasm of lung,  Acute DVT left lower extremity  Objective: CTPA with PE, Doppler LE- DVT   Goals of Care Determination: Patient wishes to focus on treatment and return to home  Plan:  Anticoagulation and Palliative, Oncology, PT/OT consult  Code Status: DNR CCA        Time spent on Advanced care Plannin minutes  Advanced Care Planning Documents: Completed advanced directives on chart, daughter is the POA.     Betty Leyva MD  2019 10:48 AM

## 2019-09-18 NOTE — PROGRESS NOTES
also pertinent to this visit. has a past medical history of Nieves's esophagus, Cholelithiasis, CKD (chronic kidney disease) stage 4, GFR 15-29 ml/min ([facility]), COPD (chronic obstructive pulmonary disease) ([facility] [address] Endometriosis, Hypertension, Hypothyroidism, Non-small cell lung cancer (NSCLC) ([facility] [address] Osteoporosis, Overactive bladder, Right bundle branch block, Thyroid disease, and Urethral stricture. has a past surgical history that includes Endometrial ablation (07/1960); jerry and bso (cervix removed) (11/72); Knee arthroscopy (prior to 2000); Carpal tunnel release (prior to 2000); Breast surgery (10/2002); shoulder surgery (06/2003); hernia repair (11/2003); Cataract removal (03, 04/2004); Total knee arthroplasty (05/2006); Total knee arthroplasty (03/2007); Urethra dilation (2009); Colonoscopy (2011); Upper gastrointestinal endoscopy (2011); Cystoscopy (2011); Esophagus dilation (2012); Appendectomy; Hysterectomy; joint replacement; eye surgery; laminectomy (10/2/13); bronchoscopy (N/A, 8/12/2019); and bronchoscopy (N/A, 8/12/2019). Restrictions  Restrictions/Precautions  Restrictions/Precautions: Fall Risk(High fall risk)  Required Braces or Orthoses?: No  Position Activity Restriction  Other position/activity restrictions: Blanca Graham is a 82 y.o. female with past medical history of Nieves's esophagitis, CKD, COPD, hypertension, thyroid disease and overactive bladder who presents the ED with complaint of fatigue and weakness. Patient states she has stage IV lung cancer. States she was diagnosed back in August with stage IV lung cancer. Has mets to the intrathoracic structures and also to the pelvic bone. States he is followed up with oncology, Dr. Salma Jameson, and had first dose of immunotherapy on Tuesday. Family states since first dose of immunotherapy has had increasing weakness, fatigue, decreased oral intake, nausea and difficulty ambulating. Has had nonproductive cough.   Denies chest pain or

## 2019-09-18 NOTE — PROGRESS NOTES
rubs or gallops. RRR  Abdomen: Soft, non-tender, non-distended, bowel sounds present. Musculoskeletal: No clubbing, cyanosis or edema bilaterally. Skin: Skin color, texture, turgor normal.  No rashes or lesions. Neurologic:  Cranial nerves: II-XII intact, TAYLA, No focal sensory/motor deficits  Psychiatric: Alert and oriented, thought content appropriate  Capillary Refill: Brisk,< 3 seconds   Peripheral Pulses: +2 palpable, equal bilaterally       Intake/Output Summary (Last 24 hours) at 9/18/2019 1044  Last data filed at 9/18/2019 0635  Gross per 24 hour   Intake 1174.02 ml   Output 825 ml   Net 349.02 ml       Labs:   Recent Labs     09/16/19  1332 09/17/19  0652 09/18/19  0400   WBC 12.7* 9.7 8.2   HGB 15.7 14.8 13.9   HCT 49.9* 46.8 43.3    109* 122*      Recent Labs     09/16/19  1428 09/17/19  0652 09/18/19  0400   * 135* 137   K 4.9 5.0 5.1   CL 99 98* 101   CO2 25 25 26   BUN 51* 51* 43*   CREATININE 1.6* 1.7* 1.6*   CALCIUM 10.1 9.8 9.4   PHOS  --   --  2.3*   AST 40* 39*  --    * 109*  --    BILITOT 1.1* 0.8  --    ALKPHOS 81 76  --      Recent Labs     09/16/19  1428 09/16/19  1820 09/17/19  0121   TROPONINI 0.07* 0.07* 0.08*       Urinalysis:    Lab Results   Component Value Date    NITRU Negative 09/17/2019    WBCUA 21 09/17/2019    BACTERIA 1+ 10/07/2013    RBCUA 1 09/17/2019    BLOODU Negative 09/17/2019    SPECGRAV >1.030 09/17/2019    GLUCOSEU Negative 09/17/2019       Radiology:  VL Extremity Venous Bilateral   Final Result      CT CHEST PULMONARY EMBOLISM W CONTRAST   Final Result   1. Bilateral pulmonary emboli. Right ventricular chamber dilatation suggests   right heart strain. Small foci of nonenhancing airspace disease in the right   middle lobe and lingula may indicate small areas of infarct   2. Right upper lobe collapse due to right hilar mass obstructing the right   upper lobe bronchus   3. Stable mediastinal adenopathy   4.  Left lung nodules which may be

## 2019-09-18 NOTE — PLAN OF CARE
Problem: Falls - Risk of:  Goal: Will remain free from falls  Description  Will remain free from falls  Outcome: Met This Shift     Problem: Risk for Impaired Skin Integrity  Goal: Tissue integrity - skin and mucous membranes  Description  Structural intactness and normal physiological function of skin and  mucous membranes. Outcome: Met This Shift     Problem: Pain:  Description  Pain management should include both nonpharmacologic and pharmacologic interventions.   Goal: Pain level will decrease  Description  Pain level will decrease  Outcome: Met This Shift

## 2019-09-18 NOTE — PROGRESS NOTES
visit.     has a past medical history of Nieves's esophagus, Cholelithiasis, CKD (chronic kidney disease) stage 4, GFR 15-29 ml/min ([facility]), COPD (chronic obstructive pulmonary disease) ([facility] [address] Endometriosis, Hypertension, Hypothyroidism, Non-small cell lung cancer (NSCLC) ([facility] [address] Osteoporosis, Overactive bladder, Right bundle branch block, Thyroid disease, and Urethral stricture. has a past surgical history that includes Endometrial ablation (07/1960); jerry and bso (cervix removed) (11/72); Knee arthroscopy (prior to 2000); Carpal tunnel release (prior to 2000); Breast surgery (10/2002); shoulder surgery (06/2003); hernia repair (11/2003); Cataract removal (03, 04/2004); Total knee arthroplasty (05/2006); Total knee arthroplasty (03/2007); Urethra dilation (2009); Colonoscopy (2011); Upper gastrointestinal endoscopy (2011); Cystoscopy (2011); Esophagus dilation (2012); Appendectomy; Hysterectomy; joint replacement; eye surgery; laminectomy (10/2/13); bronchoscopy (N/A, 8/12/2019); and bronchoscopy (N/A, 8/12/2019). Treatment Diagnosis: Decreased ADLs, transfers, mobility associated with B PE      Restrictions  Restrictions/Precautions  Restrictions/Precautions: Fall Risk(High fall risk)  Required Braces or Orthoses?: No  Position Activity Restriction  Other position/activity restrictions: Adrian Willson is a 82 y.o. female with past medical history of Nieves's esophagitis, CKD, COPD, hypertension, thyroid disease and overactive bladder who presents the ED with complaint of fatigue and weakness. Patient states she has stage IV lung cancer. States she was diagnosed back in August with stage IV lung cancer. Has mets to the intrathoracic structures and also to the pelvic bone. States he is followed up with oncology, Dr. Diandra Novak, and had first dose of immunotherapy on Tuesday.   Family states since first dose of immunotherapy has had increasing weakness, fatigue, decreased oral intake, nausea and difficulty

## 2019-09-18 NOTE — PROGRESS NOTES
Intake 1294.02 ml   Output 825 ml   Net 469.02 ml      Wt Readings from Last 3 Encounters:   09/18/19 171 lb 6.4 oz (77.7 kg)   09/06/19 176 lb (79.8 kg)   09/05/19 176 lb (79.8 kg)        General appearance: Appears comfortable. on NC   Eyes: Sclera clear, pupils equal  ENT: Moist mucus membranes, no thrush  Neck: Trachea midline, symmetrical  Cardiovascular: Regular rhythm, normal S1, S2. No murmur, gallop, rub. No edema in  lower extremities  Respiratory: Diminished AE RUL and Rt bases  Gastrointestinal: Abdomen soft, not tender, not distended, normal bowel sounds  Musculoskeletal: No cyanosis in digits, warm extremities  Neurologic: Cranial nerves grossly intact, no motor or speech deficits. Psychiatric: Normal affect. Alert and oriented to time, place and person.   Skin: Warm, dry, normal turgor, no rash    DATA:  CBC:   Lab Results   Component Value Date    WBC 8.2 09/18/2019    RBC 4.68 09/18/2019    HGB 13.9 09/18/2019    HCT 43.3 09/18/2019    MCV 92.6 09/18/2019    MCH 29.6 09/18/2019    MCHC 32.0 09/18/2019    RDW 16.2 09/18/2019     09/18/2019    MPV 10.6 09/18/2019     BMP:  Lab Results   Component Value Date     09/18/2019    K 5.1 09/18/2019    K 5.0 09/17/2019     09/18/2019    CO2 26 09/18/2019    BUN 43 09/18/2019    CREATININE 1.6 09/18/2019    CALCIUM 9.4 09/18/2019    GFRAA 37 09/18/2019    LABGLOM 31 09/18/2019    GLUCOSE 121 09/18/2019     Magnesium:   Lab Results   Component Value Date    MG 2.20 08/14/2019    MG 2.10 08/13/2019     LIVER PROFILE:   Recent Labs     09/16/19  1428 09/17/19  0652   AST 40* 39*   * 109*   BILITOT 1.1* 0.8   ALKPHOS 81 76     PT/INR:    Lab Results   Component Value Date    PROTIME 11.0 09/16/2019    PROTIME 11.4 08/30/2019    PROTIME 10.6 09/25/2013    INR 0.96 09/16/2019    INR 1.00 08/30/2019    INR 0.94 09/25/2013     IMAGING:    CT OF THE CHEST WITHOUT CONTRAST 8/11/2019 3:38 pm       TECHNIQUE:   CT of the chest was performed consideration.       2.  Mediastinal and bilateral hilar lymphadenopathy consistent with   metastatic disease.       3.  Small to moderate pericardial and small right pleural effusions,   underlying malignant involvement is not excluded.       4.  Perihilar predominant right upper and superior middle lobe   consolidations.  Findings may relate to postobstructive atelectasis. Underlying infection or malignant involvement should also be considered.       5.  Mild thickening of the left adrenal gland could relate to hyperplasia. Continued attention on follow-up recommended.       6.   Incidental findings include atherosclerosis, ectatic ascending thoracic   aorta, degenerative osseous changes, thoracic spinal stimulator leads,   cholelithiasis and small hiatal hernia.       RECOMMENDATIONS:   Follow-up PET-CT.  Transbronchial tissue sampling may also be helpful.         PET/CT 8/21/19    1. Metabolically active right upper lobe mass consistent with history of lung cancer with metabolically active right hilar, mediastinal, and bilateral supraclavicular lymph node metastases and a skeletal metastasis. 2. FDG activity associated with the moderate right pleural effusion raises the possibility of malignant effusion. 3. Small pericardial effusion. Pathology:   8/12/19    EBUS Path:  Account No:    [de-identified]                  Collected:     08/12/2019  Med Rec No:    [id number]                 Received:      08/12/2019  Completed:     08/13/2019    FINAL DIAGNOSIS:    A. Lung, right upper lobe, brushing:       -  Non-small cell carcinoma, favor adenocarcinoma    B. Lymph node, subcarinal, endoscopic ultrasound guided fine needle  aspiration:       -  Non-small cell carcinoma, favor adenocarcinoma    C.  Lung, right upper lobe, bronchial alveolar lavage:       -  Non-small cell carcinoma, favor adenocarcinoma    IMPRESSION/RECOMMENDATIONS:       NSCLC-IV  - PET/CT:Metabolically active right upper lobe mass consistent with history of lung cancer with metabolically active right hilar, mediastinal, and bilateral supraclavicular lymph node metastases and a skeletal metastasis. 2. FDG activity associated with the moderate right pleural effusion raises the possibility of malignant effusion. 3. Small pericardial effusion.     -recurrent Rt side pleural effusion-Malignant  -s/p Thoracentesis on 8/30/19  - CT head wo 8/13/19: no large IC lesion or edema  - OP PET/CT would be arranged next 1-2 week  - PDL-1 High 95 %,other molecular targeted markers negative  -s/p C1 D1 Keytruda 9/10/19-Palliative systemic Tx    Dyspnea/CP    Bilateral PE  - on hep drip  -Pending LE Doppler    Recurrent right side pleural effusion-maliagnant    Pericardial   -following with cardiology    CKD  - Nephrology following OP. Abnormal weight loss  - Receiving marinol. Failure to thrive  -on po marinol as OP 5 mg BID    Pulmonary consult  Recs. Noted. Patient improving from PE point of view next 24-hour while on heparin I would recommend to arrange for Pleurx  Cath as patient recent thoracentesis was on 8/30/2019-patient already reaccumulated fluid on the right side of the lung. Future compromise on oral anticoagulation would be delaying her thoracentesis as well as placing a Pleurx catheter- also for palliative purpose. If no procedure planned patient can be switched to Eliquis 5 mg twice a day(lower dose due to age more than [de-identified] as well as CKD with a GFR 30-31), eventually will switch to 2.5 MG twice a day. Discussed with hospitalist.    I discussed with the patient and daughter as well as sister again, recommended to discuss about CODE STATUS which hospitalist addressed to DNR CCA lator. Patient overall performance status has declined with multiple comorbidities including new PE bilateral lung and her stage IV non-small cell lung cancer; option of palliative care/best supportive care discussed.     Patient with long-term

## 2019-09-19 VITALS
SYSTOLIC BLOOD PRESSURE: 129 MMHG | HEART RATE: 98 BPM | HEIGHT: 68 IN | WEIGHT: 169.5 LBS | BODY MASS INDEX: 25.69 KG/M2 | OXYGEN SATURATION: 94 % | DIASTOLIC BLOOD PRESSURE: 80 MMHG | RESPIRATION RATE: 16 BRPM | TEMPERATURE: 97.5 F

## 2019-09-19 LAB
ALBUMIN SERPL-MCNC: 3.1 G/DL (ref 3.4–5)
ANION GAP SERPL CALCULATED.3IONS-SCNC: 11 MMOL/L (ref 3–16)
APTT: 123.2 SEC (ref 26–36)
APTT: 50.6 SEC (ref 26–36)
BUN BLDV-MCNC: 33 MG/DL (ref 7–20)
CALCIUM SERPL-MCNC: 9.5 MG/DL (ref 8.3–10.6)
CHLORIDE BLD-SCNC: 100 MMOL/L (ref 99–110)
CO2: 22 MMOL/L (ref 21–32)
CREAT SERPL-MCNC: 1.4 MG/DL (ref 0.6–1.2)
GFR AFRICAN AMERICAN: 44
GFR NON-AFRICAN AMERICAN: 36
GLUCOSE BLD-MCNC: 92 MG/DL (ref 70–99)
PHOSPHORUS: 2.3 MG/DL (ref 2.5–4.9)
POTASSIUM SERPL-SCNC: 4.8 MMOL/L (ref 3.5–5.1)
SODIUM BLD-SCNC: 133 MMOL/L (ref 136–145)

## 2019-09-19 PROCEDURE — 6370000000 HC RX 637 (ALT 250 FOR IP): Performed by: INTERNAL MEDICINE

## 2019-09-19 PROCEDURE — 94761 N-INVAS EAR/PLS OXIMETRY MLT: CPT

## 2019-09-19 PROCEDURE — 6360000002 HC RX W HCPCS: Performed by: PHYSICIAN ASSISTANT

## 2019-09-19 PROCEDURE — 6360000002 HC RX W HCPCS: Performed by: INTERNAL MEDICINE

## 2019-09-19 PROCEDURE — 85730 THROMBOPLASTIN TIME PARTIAL: CPT

## 2019-09-19 PROCEDURE — 80069 RENAL FUNCTION PANEL: CPT

## 2019-09-19 PROCEDURE — 2700000000 HC OXYGEN THERAPY PER DAY

## 2019-09-19 PROCEDURE — 94640 AIRWAY INHALATION TREATMENT: CPT

## 2019-09-19 PROCEDURE — 2580000003 HC RX 258: Performed by: INTERNAL MEDICINE

## 2019-09-19 PROCEDURE — 36415 COLL VENOUS BLD VENIPUNCTURE: CPT

## 2019-09-19 RX ORDER — ASPIRIN 81 MG/1
81 TABLET, CHEWABLE ORAL DAILY
Qty: 30 TABLET | Refills: 3 | Status: SHIPPED | OUTPATIENT
Start: 2019-09-20

## 2019-09-19 RX ORDER — DILTIAZEM HYDROCHLORIDE 120 MG/1
120 CAPSULE, COATED, EXTENDED RELEASE ORAL DAILY
Qty: 30 CAPSULE | Refills: 3 | Status: SHIPPED | OUTPATIENT
Start: 2019-09-20

## 2019-09-19 RX ORDER — LANOLIN ALCOHOL/MO/W.PET/CERES
3 CREAM (GRAM) TOPICAL NIGHTLY PRN
Status: DISCONTINUED | OUTPATIENT
Start: 2019-09-19 | End: 2019-09-19 | Stop reason: HOSPADM

## 2019-09-19 RX ADMIN — IPRATROPIUM BROMIDE AND ALBUTEROL SULFATE 3 ML: .5; 3 SOLUTION RESPIRATORY (INHALATION) at 09:29

## 2019-09-19 RX ADMIN — SPIRONOLACTONE 25 MG: 25 TABLET ORAL at 08:50

## 2019-09-19 RX ADMIN — Medication 10 ML: at 08:51

## 2019-09-19 RX ADMIN — PANTOPRAZOLE SODIUM 40 MG: 40 TABLET, DELAYED RELEASE ORAL at 05:54

## 2019-09-19 RX ADMIN — HEPARIN SODIUM 3190 UNITS: 1000 INJECTION, SOLUTION INTRAVENOUS; SUBCUTANEOUS at 04:26

## 2019-09-19 RX ADMIN — APIXABAN 5 MG: 5 TABLET, FILM COATED ORAL at 12:55

## 2019-09-19 RX ADMIN — ONDANSETRON 4 MG: 2 INJECTION INTRAMUSCULAR; INTRAVENOUS at 04:41

## 2019-09-19 RX ADMIN — SODIUM CHLORIDE: 4.5 INJECTION, SOLUTION INTRAVENOUS at 02:10

## 2019-09-19 RX ADMIN — ASPIRIN 81 MG 81 MG: 81 TABLET ORAL at 08:51

## 2019-09-19 RX ADMIN — LEVOTHYROXINE SODIUM 100 MCG: 100 TABLET ORAL at 05:54

## 2019-09-19 RX ADMIN — DRONABINOL 5 MG: 2.5 CAPSULE ORAL at 08:50

## 2019-09-19 RX ADMIN — DILTIAZEM HYDROCHLORIDE 120 MG: 120 CAPSULE, COATED, EXTENDED RELEASE ORAL at 08:50

## 2019-09-19 RX ADMIN — HEPARIN SODIUM 8.27 UNITS/KG/HR: 10000 INJECTION, SOLUTION INTRAVENOUS at 04:28

## 2019-09-19 RX ADMIN — IPRATROPIUM BROMIDE AND ALBUTEROL SULFATE 3 ML: .5; 3 SOLUTION RESPIRATORY (INHALATION) at 13:35

## 2019-09-19 RX ADMIN — IPRATROPIUM BROMIDE AND ALBUTEROL SULFATE 3 ML: .5; 3 SOLUTION RESPIRATORY (INHALATION) at 16:33

## 2019-09-19 ASSESSMENT — PAIN SCALES - GENERAL
PAINLEVEL_OUTOF10: 0

## 2019-09-19 NOTE — CONSULTS
Meds:.heparin (porcine), heparin (porcine), sodium chloride flush, magnesium hydroxide, ondansetron, HYDROcodone 5 mg - acetaminophen      Allergies reviewed by me: Cephalexin; Reclast [zoledronic acid]; and Sulfa antibiotics          PHYSICAL EXAM:  Recent vital signs and recent I/Os reviewed by me. Patient Vitals for the past 24 hrs:   BP Temp Temp src Pulse Resp SpO2 Weight   09/19/19 0843 (!) 143/85 97.5 °F (36.4 °C) Oral 85 18 96 % --   09/19/19 0820 -- -- -- -- -- -- 169 lb 8 oz (76.9 kg)   09/19/19 0440 129/86 97.4 °F (36.3 °C) Oral 99 18 90 % --   09/19/19 0000 119/80 97.4 °F (36.3 °C) Axillary 99 18 90 % --   09/18/19 2125 113/74 97.7 °F (36.5 °C) Oral 103 18 91 % --   09/18/19 2007 -- -- -- -- -- 90 % --   09/18/19 1635 112/76 97.4 °F (36.3 °C) Oral 93 18 93 % --   09/18/19 1538 -- -- -- -- 18 91 % --   09/18/19 1218 105/67 97.4 °F (36.3 °C) Oral 96 18 96 % --   09/18/19 1155 -- -- -- -- 18 98 % --   09/18/19 0947 116/78 97.4 °F (36.3 °C) Oral 91 18 98 % --       Intake/Output Summary (Last 24 hours) at 9/19/2019 0924  Last data filed at 9/18/2019 1855  Gross per 24 hour   Intake 160 ml   Output 350 ml   Net -190 ml       Physical Exam   Constitutional: She is oriented to person, place, and time. She appears well-nourished. She appears distressed (mild). HENT:   Head: Normocephalic and atraumatic. Right Ear: External ear normal.   Left Ear: External ear normal.   Nose: Nose normal.   Mouth/Throat: Mucous membranes are not dry. Eyes: Conjunctivae are normal. No scleral icterus. Neck: Normal range of motion. Neck supple. No JVD present. No thyroid mass present. Cardiovascular: Normal rate and regular rhythm. Pulmonary/Chest: Breath sounds normal. She is in respiratory distress (mild). Rales: diminished at bases. Abdominal: Soft. Bowel sounds are normal.   Musculoskeletal: She exhibits no edema or deformity. Neurological: She is alert and oriented to person, place, and time.    Skin: Skin is GLUCOSEU Negative 09/17/2019     Microalbumen/Creatinine ratio:  No components found for: RUCREAT  24 Hour Urine for Protein:  No components found for: RAWUPRO, UHRS3, NDTI87YY, UTV3  24 Hour Urine for Creatinine Clearance:  No components found for: CREAT4, UHRS10, UTV10  Urine Toxicology:  No components found for: IAMMENTA, IBARBIT, IBENZO, ICOCAINE, IMARTHC, IOPIATES, IPHENCYC    HgBA1c:  No results found for: LABA1C  RPR:  No results found for: RPR  HIV:  No results found for: HIV  ULISES:  No results found for: ANATITER, ULISES  RF:  No results found for: RF  DSDNA:  No components found for: DNA  AMYLASE:  No results found for: AMYLASE  LIPASE:  No results found for: LIPASE  Fibrinogen Level:  No components found for: FIB       BELOW MENTIONED RADIOLOGY STUDY RESULTS BY ME:    Xr Chest Standard (2 Vw)    Result Date: 9/7/2019  EXAMINATION: TWO XRAY VIEWS OF THE CHEST 9/6/2019 4:36 pm COMPARISON: August 30, 2019 HISTORY: ORDERING SYSTEM PROVIDED HISTORY: H/O pleural effusion TECHNOLOGIST PROVIDED HISTORY: Reason for exam:->assess for pleural effusion Reason for Exam: F/u thoracentesis 1 week ago. Patient has stage 4 lung CA per daughter. Acuity: Chronic Type of Exam: Subsequent/Follow-up FINDINGS: Cardiac silhouette is enlarged. Elevation the right hemidiaphragm, unchanged. Fullness of the right hilum in opacity in the right upper lung medially, unchanged. No significant change in appearance of the chest.     Xr Chest 1 Vw    Result Date: 8/30/2019  EXAMINATION: ONE XRAY VIEW OF THE CHEST 8/30/2019 11:32 am COMPARISON: August 11, 2019 radiograph and PET-CT August 21, 2019 HISTORY: ORDERING SYSTEM PROVIDED HISTORY: Evaluate for possible pneumothorax post procedure. right thora, 400 cc . Dr Ishmael Mantilla TECHNOLOGIST PROVIDED HISTORY: CXR immediate post procedure. Reason for exam:->Evaluate for possible pneumothorax post procedure. right thora, 400 cc .  Dr Ishmael Mantilla Reason for Exam: Evaluate for possible pneumothorax hilar mass causing right upper lobe bronchial obstruction and collapse. The mass is difficult to measure because of adjacent consolidated lung. New 1.2 x 1.0 cm nodule in the left upper lobe. New smaller nodules in the left upper lobe. Interval enlargement of a nodule in the superior segment of the left lower lobe measuring 7 mm. Small foci of nonenhancing peripheral airspace disease in the right middle lobe and lingula. Moderate right pleural effusion, increased in size. Compressive atelectasis in the right lower lobe. Upper Abdomen: Stones in the gallbladder Soft Tissues/Bones: No suspicious bone lesion     1. Bilateral pulmonary emboli. Right ventricular chamber dilatation suggests right heart strain. Small foci of nonenhancing airspace disease in the right middle lobe and lingula may indicate small areas of infarct 2. Right upper lobe collapse due to right hilar mass obstructing the right upper lobe bronchus 3. Stable mediastinal adenopathy 4. Left lung nodules which may be inflammatory or metastatic 5. Moderate right pleural effusion, increased in size 6. Stable pericardial effusion 7. 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 Universal protocol was performed. The right chest was prepped and draped in sterile fashion and local anesthesia was achieved with lidocaine. An 5 Divehi needle sheath was advanced under ultrasound guidance into pleural effusion and thoracentesis was performed.  The adrenal mass. No metabolically active intraperitoneal mass. No metabolically active abdominal or pelvic lymphadenopathy. Physiologic activity in the gastrointestinal and genitourinary systems. BONES/SOFT TISSUE: Focal FDG activity corresponds to a small lytic lesion in the left iliac crest. INCIDENTAL CT FINDINGS: Atherosclerotic calcifications within the coronary arteries and the aorta and its branches. Small pericardial effusion. Cholelithiasis. Left renal cysts. Spinal stimulator device. Status post hysterectomy. 1.  Metabolically active right upper lobe mass consistent with history of lung cancer with metabolically active right hilar, mediastinal, and bilateral supraclavicular lymph node metastases and a skeletal metastasis. 2.  FDG activity associated with the moderate right pleural effusion raises the possibility of malignant effusion. 3.  Small pericardial effusion.

## 2019-09-19 NOTE — PLAN OF CARE
Problem: Falls - Risk of:  Goal: Will remain free from falls  Description  Will remain free from falls  9/19/2019 0015 by Marie Brumfield RN  Outcome: Ongoing  Note:   Pt remains free from falls and accidental injury at this time. Fall precautions in place, bed alarm activated, yellow blanket on bed, fall risk band on pt. Floor free from obstacles and pt encouraged to call before getting OOB and as needed. Using call light appropriately. Will continue to monitor additional needs. Problem: Risk for Impaired Skin Integrity  Goal: Tissue integrity - skin and mucous membranes  Description  Structural intactness and normal physiological function of skin and  mucous membranes. 9/19/2019 0015 by Marie Brumfield RN  Outcome: Ongoing     Problem: Pain:  Goal: Pain level will decrease  Description  Pain level will decrease  Outcome: Ongoing  Note:   Pt denies pain. Will continue to monitor. Problem: Musculor/Skeletal Functional Status  Goal: Absence of falls  Outcome: Ongoing     Problem: Respiratory  Goal: O2 Sat > 90%  Outcome: Ongoing  Note:   Pt on RA, O2 sats 90% or greater. Will continue to monitor.

## 2019-09-19 NOTE — PROGRESS NOTES
[facility]   Progress Note      9/19/2019 11:19 AM        Name: Deforest Bloch . Admitted: 9/16/2019    SUBJECTIVE:  She is sitting comfortably in chair, continues to have some SOB. Appetite remains poor. Family at bedside. Reviewed interval ancillary notes    Current Medications    apixaban (ELIQUIS) tablet 5 mg BID   melatonin tablet 3 mg Nightly PRN   0.45 % sodium chloride infusion Continuous   diltiazem (CARDIZEM CD) extended release capsule 120 mg Daily   dronabinol (MARINOL) capsule 5 mg Daily   ipratropium-albuterol (DUONEB) nebulizer solution 3 mL Q4H   levothyroxine (SYNTHROID) tablet 100 mcg Daily   spironolactone (ALDACTONE) tablet 25 mg Daily   sodium chloride flush 0.9 % injection 10 mL 2 times per day   sodium chloride flush 0.9 % injection 10 mL PRN   magnesium hydroxide (MILK OF MAGNESIA) 400 MG/5ML suspension 30 mL Daily PRN   ondansetron (ZOFRAN) injection 4 mg Q6H PRN   HYDROcodone-acetaminophen (NORCO) 5-325 MG per tablet 1 tablet Q6H PRN   aspirin chewable tablet 81 mg Daily   pantoprazole (PROTONIX) tablet 40 mg QAM AC       Objective:  BP (!) 143/85   Pulse 85   Temp 97.5 °F (36.4 °C) (Oral)   Resp 18   Ht 5' 8\" (1.727 m)   Wt 169 lb 8 oz (76.9 kg)   LMP 07/20/1991 (Exact Date)   SpO2 94%   BMI 25.77 kg/m²     Intake/Output Summary (Last 24 hours) at 9/19/2019 1119  Last data filed at 9/19/2019 0843  Gross per 24 hour   Intake 80 ml   Output 1000 ml   Net -920 ml    Wt Readings from Last 3 Encounters:   09/19/19 169 lb 8 oz (76.9 kg)   09/06/19 176 lb (79.8 kg)   09/05/19 176 lb (79.8 kg)       General appearance:  Appears comfortable  Eyes: Sclera clear. Pupils equal.  ENT: Moist oral mucosa. Trachea midline, no adenopathy. Cardiovascular: Regular rhythm, normal S1, S2. No murmur. No edema in lower extremities  Respiratory: Not using accessory muscles. Good inspiratory effort. Clear to auscultation bilaterally, no wheeze or crackles.    GI: Abdomen

## 2019-09-19 NOTE — PROGRESS NOTES
Hospitalist Progress Note      PCP: Beverly Wilson MD    Date of Admission: 9/16/2019    LOS: 3    Chief Complaint:   Chief Complaint   Patient presents with    Fatigue     pt has stage 4 lung ca, first immunotherapy last Tuesday, patient having increased weakness since treatment, needing more assistance daily, today having a hard time moving, can only walk a few steps then she has to sit down. started last Friday. pt transported from home by [facility]. Case Summary:   26-year-old lady with history of chronic kidney disease, hypertension, COPD, non-small cell lung cancer stage IV on immunotherapy who presented with generalized malaise and weakness with fatigue worsening over the last 3 to 4 days and presented to the ER for evaluation where she was found to be tachycardic with hypoxia prompting CT pulmonary angiogram which showed bilateral pulmonary emboli with right ventricular strain. Active Hospital Problems    Diagnosis Date Noted    Acute deep vein thrombosis (DVT) of popliteal vein of left lower extremity (HCC) [I82.432] 09/18/2019    Malaise and fatigue [R53.81, R53.83] 09/17/2019    Stage 4 malignant neoplasm of lung (HCC) [C34.90]     Acute pulmonary embolism with acute cor pulmonale (HCC) [I26.09] 09/16/2019    CKD (chronic kidney disease), stage III (HCC) [N18.3] 09/04/2019    Acquired hypothyroidism [E03.9] 11/03/2016         Principal Problem:    Acute pulmonary embolism with acute cor pulmonale ([address]): Noted with right heart strain on CT chest.  Troponin leak noted at 0.08. Echocardiogram noted for borderline systolic function with EF of 50% and grade 1 diastolic dysfunction, trivial pericardial effusion and mild tricuspid regurgitation. Continue anticoagulation will switch to Eliquis today at 5 mg twice daily with plans for allergic to reduce it to 2.5 mg twice daily in the coming weeks.   Patient will follow-up with oncology/hematology in clinic    Active Problems:

## 2019-09-19 NOTE — CARE COORDINATION
[address] and they will not accept if patient is needing chemo as cost is prohibitive. Please clarify treatment plan while in SNF.   Thank you
Patient discharged 9-19-19 to [facility] at 6pm via . Please call report [phone], fax [phone]. HENS done. Patient/Family aware of and agreeable to the discharge plan. MD notified via perfect serve to please sign the [address] form.   All discharge needs met per case management
Independent  Ability to manage medications: Independent  Ability to prepare food:  Independent  Ability to maintain home (clean home, laundry): Independent  Ability to drive and/or has transportation:  Independent  Ability to do shopping:  Independent  Ability to manage finances: Independent  Is patient able to live independently?:  Yes  Hearing and Vision  Visual Impairment:  Visual impairment (Glasses/contacts)  Hearing Impairment:  None  Care Transitions Interventions         Follow Up: Staff in room with patient, CTN will attempt to see at a later time. Future Appointments   Date Time Provider Rochelle Yu   9/24/2019  4:00 PM ECHO ROOM 1 [facility]   10/4/2019  1:30 PM MICHELL Mendoza - CNP [facility] Cardio [facility]   10/8/2019  1:00 PM Liban Angeles MD [facility] ENT [facility]   10/17/2019  1:45 PM Henrique Santos MD PULM & CC [facility]   11/13/2019  1:30 PM Joshua Maria Arm, MD [facility]       Health Maintenance  There are no preventive care reminders to display for this patient.     Bob Pena RN

## 2019-09-21 LAB
BLOOD CULTURE, ROUTINE: NORMAL
CULTURE, BLOOD 2: NORMAL

## 2019-09-23 ENCOUNTER — TELEPHONE (OUTPATIENT)
Dept: FAMILY MEDICINE CLINIC | Age: 84
End: 2019-09-23

## 2019-09-24 ENCOUNTER — CARE COORDINATION (OUTPATIENT)
Dept: CARE COORDINATION | Age: 84
End: 2019-09-24

## 2019-09-24 ENCOUNTER — TELEPHONE (OUTPATIENT)
Dept: PULMONOLOGY | Age: 84
End: 2019-09-24

## 2019-09-24 NOTE — CARE COORDINATION
Sympathy Card mailed to next of kin.     Russ HUSSEINN, RN Care Manager  [facility],  [address] [place] [address] Primary Care   [phone]    '

## (undated) DEVICE — Device: Brand: MEDEX

## (undated) DEVICE — 60 ML SYRINGE,REGULAR TIP: Brand: MONOJECT

## (undated) DEVICE — SPECIMEN TRAP: Brand: ARGYLE

## (undated) DEVICE — ADAPTER TBNG DIA15MM SWVL FBROPT BRONCHSCP TERM 2 AXIS PEEP

## (undated) DEVICE — Device

## (undated) DEVICE — SINGLE USE BIOPSY VALVE MAJ-210: Brand: SINGLE USE BIOPSY VALVE (STERILE)

## (undated) DEVICE — Device: Brand: BALLOON

## (undated) DEVICE — GOWN AURORA NONREINF LG: Brand: MEDLINE INDUSTRIES, INC.

## (undated) DEVICE — SINGLE USE ASPIRATION NEEDLE: Brand: SINGLE USE ASPIRATION NEEDLE

## (undated) DEVICE — SYRINGE MED 10ML POLYPR LUERSLIP TIP FLAT TOP W/O SFTY DISP

## (undated) DEVICE — CONMED CHANNEL MASTER PULMONARY AND PEDIATRIC CLEANING BRUSH, 160 CM X 2.0 MM: Brand: CONMED

## (undated) DEVICE — PROCEDURE KIT ENDOSCP CUST

## (undated) DEVICE — SINGLE USE SUCTION VALVE MAJ-209: Brand: SINGLE USE SUCTION VALVE (STERILE)

## (undated) DEVICE — DISPOSABLE CYTOLOGY BRUSH: Brand: DISPOSABLE CYTOLOGY BRUSH